# Patient Record
Sex: MALE | Race: WHITE | URBAN - METROPOLITAN AREA
[De-identification: names, ages, dates, MRNs, and addresses within clinical notes are randomized per-mention and may not be internally consistent; named-entity substitution may affect disease eponyms.]

---

## 2017-01-01 ENCOUNTER — TRANSFERRED RECORDS (OUTPATIENT)
Dept: HEALTH INFORMATION MANAGEMENT | Facility: CLINIC | Age: 67
End: 2017-01-01

## 2017-04-04 ENCOUNTER — TRANSFERRED RECORDS (OUTPATIENT)
Dept: HEALTH INFORMATION MANAGEMENT | Facility: CLINIC | Age: 67
End: 2017-04-04

## 2017-04-11 ENCOUNTER — MEDICAL CORRESPONDENCE (OUTPATIENT)
Dept: HEALTH INFORMATION MANAGEMENT | Facility: CLINIC | Age: 67
End: 2017-04-11

## 2017-04-24 ENCOUNTER — TRANSFERRED RECORDS (OUTPATIENT)
Dept: HEALTH INFORMATION MANAGEMENT | Facility: CLINIC | Age: 67
End: 2017-04-24

## 2017-10-06 ENCOUNTER — TRANSFERRED RECORDS (OUTPATIENT)
Dept: HEALTH INFORMATION MANAGEMENT | Facility: CLINIC | Age: 67
End: 2017-10-06

## 2017-10-31 ENCOUNTER — TRANSFERRED RECORDS (OUTPATIENT)
Dept: HEALTH INFORMATION MANAGEMENT | Facility: CLINIC | Age: 67
End: 2017-10-31

## 2019-04-25 NOTE — TELEPHONE ENCOUNTER
ONCOLOGY INTAKE: Records Information      APPT INFORMATION:  Referring provider:  Dr. Jimbo Slater  Referring provider s clinic:  [facility]  Reason for visit/diagnosis:  Renal Cancer    RECORDS INFORMATION:  Were the records received with the referral (via [employer])? Yes    Has patient been seen for any external appt for this diagnosis? Yes    If yes, where? [facility]    Has patient had any imaging or procedures outside of [place] for this condition? Yes      If Yes, where? [facility]    ADDITIONAL INFORMATION:  Records faxed to records team. Patient is having a knee replaced on 5/21/19 and is ok with being seen on 5/31/19.

## 2019-04-30 ENCOUNTER — DOCUMENTATION ONLY (OUTPATIENT)
Dept: CARE COORDINATION | Facility: CLINIC | Age: 69
End: 2019-04-30

## 2019-05-21 ENCOUNTER — TRANSFERRED RECORDS (OUTPATIENT)
Dept: HEALTH INFORMATION MANAGEMENT | Facility: CLINIC | Age: 69
End: 2019-05-21

## 2019-05-22 ENCOUNTER — PRE VISIT (OUTPATIENT)
Dept: UROLOGY | Facility: CLINIC | Age: 69
End: 2019-05-22

## 2019-05-31 ENCOUNTER — APPOINTMENT (OUTPATIENT)
Dept: UROLOGY | Facility: CLINIC | Age: 69
End: 2019-05-31
Payer: COMMERCIAL

## 2019-05-31 ENCOUNTER — OFFICE VISIT (OUTPATIENT)
Dept: UROLOGY | Facility: CLINIC | Age: 69
End: 2019-05-31
Payer: COMMERCIAL

## 2019-05-31 ENCOUNTER — PRE VISIT (OUTPATIENT)
Dept: UROLOGY | Facility: CLINIC | Age: 69
End: 2019-05-31

## 2019-05-31 VITALS
HEART RATE: 60 BPM | BODY MASS INDEX: 30.62 KG/M2 | HEIGHT: 73 IN | WEIGHT: 231 LBS | SYSTOLIC BLOOD PRESSURE: 102 MMHG | DIASTOLIC BLOOD PRESSURE: 69 MMHG

## 2019-05-31 DIAGNOSIS — C64.1 MALIGNANT NEOPLASM OF KIDNEY EXCLUDING RENAL PELVIS, RIGHT (H): Primary | ICD-10-CM

## 2019-05-31 RX ORDER — CEFAZOLIN SODIUM 2 G/50ML
2 SOLUTION INTRAVENOUS
Status: CANCELLED | OUTPATIENT
Start: 2019-05-31

## 2019-05-31 RX ORDER — OXYCODONE AND ACETAMINOPHEN 5; 325 MG/1; MG/1
1 TABLET ORAL EVERY 6 HOURS PRN
COMMUNITY
End: 2019-08-13 | Stop reason: ALTCHOICE

## 2019-05-31 RX ORDER — ALLOPURINOL 100 MG/1
600 TABLET ORAL EVERY EVENING
COMMUNITY

## 2019-05-31 RX ORDER — HYDROCODONE BITARTRATE AND ACETAMINOPHEN 5; 325 MG/1; MG/1
1 TABLET ORAL EVERY 6 HOURS PRN
COMMUNITY
End: 2019-08-13 | Stop reason: ALTCHOICE

## 2019-05-31 RX ORDER — CEFAZOLIN SODIUM 1 G/50ML
1 INJECTION, SOLUTION INTRAVENOUS SEE ADMIN INSTRUCTIONS
Status: CANCELLED | OUTPATIENT
Start: 2019-05-31

## 2019-05-31 RX ORDER — METOLAZONE 2.5 MG/1
2.5 TABLET ORAL DAILY PRN
Status: ON HOLD | COMMUNITY
End: 2019-08-28

## 2019-05-31 RX ORDER — COLCHICINE 0.6 MG/1
0.6 CAPSULE ORAL DAILY
COMMUNITY
End: 2019-08-13 | Stop reason: ALTCHOICE

## 2019-05-31 RX ORDER — MAGNESIUM OXIDE 420 MG/1
840 TABLET ORAL 2 TIMES DAILY
COMMUNITY

## 2019-05-31 RX ORDER — MUPIROCIN 20 MG/G
OINTMENT TOPICAL 3 TIMES DAILY
COMMUNITY
End: 2019-08-13 | Stop reason: ALTCHOICE

## 2019-05-31 RX ORDER — POTASSIUM CHLORIDE 750 MG/1
20 CAPSULE, EXTENDED RELEASE ORAL 2 TIMES DAILY
COMMUNITY

## 2019-05-31 RX ORDER — FAMOTIDINE 20 MG
TABLET ORAL
COMMUNITY
End: 2019-08-13 | Stop reason: ALTCHOICE

## 2019-05-31 ASSESSMENT — ENCOUNTER SYMPTOMS
HOARSE VOICE: 0
HEMATURIA: 0
COUGH DISTURBING SLEEP: 0
JOINT SWELLING: 1
FLANK PAIN: 0
WHEEZING: 0
SNORES LOUDLY: 0
DIARRHEA: 1
TASTE DISTURBANCE: 0
NECK PAIN: 0
JAUNDICE: 0
BLOOD IN STOOL: 0
STIFFNESS: 1
CONSTIPATION: 0
SORE THROAT: 0
COUGH: 1
VOMITING: 0
ABDOMINAL PAIN: 0
DIFFICULTY URINATING: 0
ORTHOPNEA: 0
DYSURIA: 0
SPUTUM PRODUCTION: 1
SMELL DISTURBANCE: 0
DYSPNEA ON EXERTION: 0
MYALGIAS: 0
HYPOTENSION: 0
ARTHRALGIAS: 1
PALPITATIONS: 1
SINUS PAIN: 0
TROUBLE SWALLOWING: 0
HYPERTENSION: 0
HEARTBURN: 0
SLEEP DISTURBANCES DUE TO BREATHING: 1
SYNCOPE: 0
BOWEL INCONTINENCE: 0
NAUSEA: 0
MUSCLE CRAMPS: 1
NECK MASS: 0
LIGHT-HEADEDNESS: 0
RECTAL PAIN: 0
SINUS CONGESTION: 0
MUSCLE WEAKNESS: 0
BLOATING: 0
POSTURAL DYSPNEA: 1
SHORTNESS OF BREATH: 0
LEG PAIN: 1
BACK PAIN: 0
HEMOPTYSIS: 0

## 2019-05-31 ASSESSMENT — MIFFLIN-ST. JEOR: SCORE: 1861.69

## 2019-05-31 ASSESSMENT — PAIN SCALES - GENERAL: PAINLEVEL: SEVERE PAIN (7)

## 2019-05-31 NOTE — LETTER
2019       RE: Samir Orlando  [address] [address]     Dear Colleague,    Thank you for referring your patient, Samir Orlando, to the [facility] UROLOGY AND INST FOR PROSTATE AND UROLOGIC CANCERS at [facility]. Please see a copy of my visit note below.      Urology Clinic    Tony Garcia MD  Date of Service: 2019     Name: Samir Orlando  MRN: [id number]  Age: 68 year old  : 1950  Referring provider: Provider Not In System     Assessment and Plan:  1. Papillary renal cell carcinoma, type 2 (2.7x3.3x2.9cm) (progressively increased in size since , noted to be 1.9cm in ), located in the interpolar region of the patient's solitary right kidney.  This is a very complicated situation given his multiple active medical problems, kidney cancer, and need for blood thinners.    The patient cannot undergo MRI due to pacemaker.     The patient had a renal biopsy on 2019 showing: papillary renal cell carcinoma. CT was also notable for cholelithiasis, and colonic diverticulosis without acute diverticulitis.     We had a discussion about papillary renal cell carcinoma and how I would recommend intervention. The patient's wife tells me that the patient cannot have a partial or radical nephrectomy as he has a mechanical valve and is on Coumadin. We talked about the real chance of bleeding post-operatively.    # If the patient is able to be off the Coumadin for a period of time we could consider partial nephrectomy.    # If the patient is not able to be without Coumadin the only procedure in his case is cryotherapy.     We covered surgical risks which include but are not limited to heart attack, stroke, blood clot in the legs or lungs, death, injury to surrounding organs (intestine, liver, spleen, pancreas, lung, muscles, nerves), hernias, loss of sensation around incisions, decreased renal function, and infection.      --Tentatively  "schedule cryotherapy in the next 2 months    --The patient will see the anesthesia team  ---------------------------------------------------------------------------------------------------------------------    Chief Complaint:   New patient consultation for RCC. [facility]    HPI:   Samir Orlando is a 68 year old male with a PMH of CAD s/p CABG X1, stent x3, and mitral valve repair at [place], SSS s/p PPM. Atrial fibrillation/atrial flutter s/p failed cardioversions adn ablations, Currently on dofetilide. EDGARDO, RCC s/p nephrectomy, subdural hematoma s/p craniotomy.     In terms of Urologic history, the patient had a right renal mass found on ultrasound preformed for workup prior to knee surgery. He has a solitary right kidney after left nephrectomy done at [place] in 2000. They patient and his wife note that the tumor was cancerous, but we do not have these records. He states that he was told at this time that it was unusual because the mass was \"inside the kidney\" rather than on the external surface.     [facility]) measures 3.3 cm and is located in the interpolar region. Per the records[facility] that the mass was 1.9 cm in 2015. The patient underwent renal biopsy on 03/26/2019 showing: Papillary renal cell carcinoma, type 2.    [facility]  Dr. Jeremy Georges  Solitary right kidney with mass  Right kidney US guided core biospy  DIAGNOSIS:   -Papillary renal cell carcinoma, type 2  -histologic grade G2    The patient has a history of a TURP Jan 2015.    [facility] who noted that he would be acceptable for surgical intervention.     Review of Systems:   Pertinent items are noted in HPI or as below, remainder of complete ROS is negative.      Active Medications:      allopurinol (ZYLOPRIM) 100 MG tablet, Take 100 mg by mouth daily, Disp: , Rfl:      ATORVASTATIN CALCIUM PO, Take 80 mg by mouth " "daily , Disp: , Rfl:      colchicine (MITIGARE) 0.6 MG capsule, Take 0.6 mg by mouth daily, Disp: , Rfl:      Furosemide (LASIX PO), Take 40 mg by mouth 2 times daily, Disp: , Rfl:      HYDROcodone-acetaminophen (NORCO) 5-325 MG tablet, Take 1 tablet by mouth every 6 hours as needed for severe pain, Disp: , Rfl:      Vitamin D, Cholecalciferol, 1000 units CAPS, , Disp: , Rfl:      albuterol (PROAIR HFA, PROVENTIL HFA, VENTOLIN HFA) 108 (90 BASE) MCG/ACT inhaler, Inhale 2 puffs into the lungs every 6 hours as needed for shortness of breath / dyspnea or wheezing, Disp: , Rfl:      aspirin 81 MG chewable tablet, Take 1 tablet (81 mg) by mouth daily .  Please re-evaluate wether to resume Aspirin at f/u appointment., Disp: 36 tablet, Rfl: 0     DOFETILIDE PO, Take 500 mcg by mouth 2 times daily , Disp: , Rfl:      GLIPIZIDE PO, , Disp: , Rfl:      levETIRAcetam (KEPPRA) 750 MG tablet, Take 1 tablet (750 mg) by mouth 2 times daily for 14 days, Disp: 28 tablet, Rfl: 0     LISINOPRIL PO, Take 5 mg by mouth daily, Disp: , Rfl:      METFORMIN HCL PO, , Disp: , Rfl:      METOPROLOL TARTRATE PO, Take 50 mg by mouth 2 times daily , Disp: , Rfl:      OMEPRAZOLE PO, Take 20 mg by mouth At Bedtime , Disp: , Rfl:      senna-docusate (SENOKOT-S;PERICOLACE) 8.6-50 MG per tablet, Take 1 tablet by mouth 2 times daily, Disp: 30 tablet, Rfl: 0     warfarin (COUMADIN) 1 MG tablet, Take 5 tablets (5 mg) by mouth daily .  Do not resume medication until cleared by Dr. Chapman.  Will re-evaluate at f/u appointment., Disp: 30 tablet, Rfl: 0      Allergies:   The patient reports no known allergies.     Past Medical History:  Subdural hematoma  Pacemaker     Past Surgical History:  [place] holes evacuate subdural hematoma    Family History:   No past pertinent family history.     Social History:   The patient denies drugs use    Physical Exam:   PHYSICAL EXAM  /69   Pulse 60   Ht 1.854 m (6' 1\")   Wt 104.8 kg (231 lb)   BMI 30.48 kg/m   "   Constitutional: Alert, no acute distress  Psychiatric: Normal mood and affect  Head: Normocephalic. No masses, lesions, tenderness or abnormalities  Neck: Neck supple. No adenopathy. Thyroid symmetric, normal size  Back: No spinal tenderness.  No costovertebral angle   Gastrointestinal: Abdomen soft, non-tender. No masses, organomegaly. No hernia.   Skin: no suspicious lesions or rashes on abdomen  Extremities: no lower extremity edema.   Neurologic: Cranial nerves grossly intact.  Equal strength and sensation on bilateral extremities.   : Deferred    Imaging and outside records:   -I reviewed the radiology reports and pathology reports [facility].    Laboratory:   I reviewed all applicable laboratory and pathology data and went over findings with patient  Significant for     04/03/2019: creatinine 1.2  03/25/2019: creatinine 1.3    Lab Results   Component Value Date    CR 1.11 08/13/2015    CR 1.08 08/12/2015    CR 1.34 08/11/2015    CR 1.33 08/10/2015     CBC RESULTS:  Recent Labs   Lab Test 08/10/15  2222   WBC 11.0   HGB 12.2*        BMP RESULTS:  Recent Labs   Lab Test 05/12/17  0938 08/13/15  0629 08/12/15  0718 08/11/15  0726 08/10/15  2222   NA  --   --   --   --  140   POTASSIUM  --  3.8 3.9 4.0 3.6   CHLORIDE  --   --   --   --  104   CO2  --   --   --   --  24   ANIONGAP  --   --   --   --  11   *  --   --   --  205*   BUN  --   --   --   --  22   CR  --  1.11 1.08 1.34* 1.33*   GFRESTIMATED  --  66 68 53* 54*   GFRESTBLACK  --  80 83 64 65   CLARI  --   --   --   --  10.3*     Scribe Disclosure:  I, Luis A Flores, am serving as a scribe to document services personally performed by Tony Garcia MD at this visit, based upon the provider's statements to me. All documentation has been reviewed by the aforementioned provider prior to being entered into the official medical record.     Luis A Flores served as the scribe for this patient's visit  and documented my history and physical exam.  I performed the history and physical exam.  I have edited and agree with the note.  JA Garcia MD      CC  Patient Care Team:  [facility] as PCP - General  Ari, Carrol Fuentes MD as MD (Neurosurgery)  PROVIDER NOT IN SYSTEM    Copy to patient  BALBINA REYES  [address] [address]    Answers for HPI/ROS submitted by the patient on 5/31/2019   General Symptoms: No  Skin Symptoms: No  HENT Symptoms: Yes  EYE SYMPTOMS: No  HEART SYMPTOMS: Yes  LUNG SYMPTOMS: Yes  INTESTINAL SYMPTOMS: Yes  URINARY SYMPTOMS: Yes  REPRODUCTIVE SYMPTOMS: Yes  SKELETAL SYMPTOMS: Yes  BLOOD SYMPTOMS: No  NERVOUS SYSTEM SYMPTOMS: No  MENTAL HEALTH SYMPTOMS: No  Ear pain: No  Ear discharge: No  Hearing loss: Yes  Tinnitus: Yes  Nosebleeds: No  Congestion: No  Sinus pain: No  Trouble swallowing: No   Voice hoarseness: No  Mouth sores: No  Sore throat: No  Tooth pain: No  Gum tenderness: No  Bleeding gums: No  Change in taste: No  Change in sense of smell: No  Dry mouth: No  Hearing aid used: Yes  Neck lump: No  Cough: Yes  Sputum or phlegm: Yes  Coughing up blood: No  Difficulty breating or shortness of breath: No  Snoring: No  Wheezing: No  Difficulty breathing on exertion: No  Nighttime Cough: No  Difficulty breathing when lying flat: Yes  Chest pain or pressure: No  Fast or irregular heartbeat: Yes  Pain in legs with walking: Yes  Trouble breathing while lying down: No  Fingers or toes appear blue: No  High blood pressure: No  Low blood pressure: No  Fainting: No  Murmurs: No  Pacemaker: Yes  Varicose veins: No  Edema or swelling: No  Wake up at night with shortness of breath: Yes  Light-headedness: No  Heart burn or indigestion: No  Nausea: No  Vomiting: No  Abdominal pain: No  Bloating: No  Constipation: No  Diarrhea: Yes  Blood in stool: No  Black stools: No  Rectal or Anal pain: No  Fecal incontinence: No  Yellowing of skin or eyes: No  Vomit with  blood: No  Change in stools: No  Pain or burning: No  Trouble starting or stopping: No  Increased frequency of urination: No  Blood in urine: No  Decreased frequency of urination: No  Frequent nighttime urination: No  Flank pain: No  Difficulty emptying bladder: No  Back pain: No  Muscle aches: No  Neck pain: No  Swollen joints: Yes  Joint pain: Yes  Bone pain: No  Muscle cramps: Yes  Muscle weakness: No  Joint stiffness: Yes  Bone fracture: No  Scrotal pain or swelling: No  Erectile dysfunction: Yes  Penile discharge: No  Genital ulcers: No  Reduced libido: No      Again, thank you for allowing me to participate in the care of your patient.      Sincerely,    Tony Garcia MD

## 2019-05-31 NOTE — PROGRESS NOTES
Urology Clinic    Tony Garcia MD  Date of Service: 2019     Name: Samir Orlando  MRN: [id number]  Age: 68 year old  : 1950  Referring provider: Provider Not In System     Assessment and Plan:  1. Papillary renal cell carcinoma, type 2 (2.7x3.3x2.9cm) (progressively increased in size since , noted to be 1.9cm in ), located in the interpolar region of the patient's solitary right kidney.  This is a very complicated situation given his multiple active medical problems, kidney cancer, and need for blood thinners.    The patient cannot undergo MRI due to pacemaker.     The patient had a renal biopsy on 2019 showing: papillary renal cell carcinoma. CT was also notable for cholelithiasis, and colonic diverticulosis without acute diverticulitis.     We had a discussion about papillary renal cell carcinoma and how I would recommend intervention. The patient's wife tells me that the patient cannot have a partial or radical nephrectomy as he has a mechanical valve and is on Coumadin. We talked about the real chance of bleeding post-operatively.    # If the patient is able to be off the Coumadin for a period of time we could consider partial nephrectomy.    # If the patient is not able to be without Coumadin the only procedure in his case is cryotherapy.     We covered surgical risks which include but are not limited to heart attack, stroke, blood clot in the legs or lungs, death, injury to surrounding organs (intestine, liver, spleen, pancreas, lung, muscles, nerves), hernias, loss of sensation around incisions, decreased renal function, and infection.      --Tentatively schedule cryotherapy in the next 2 months    --The patient will see the anesthesia team  ---------------------------------------------------------------------------------------------------------------------    Chief Complaint:   New patient consultation for RCC. [facility] Jimbo Marks  "[place]    HPI:   Samir Orlando is a 68 year old male with a PMH of CAD s/p CABG X1, stent x3, and mitral valve repair at [place], SSS s/p PPM. Atrial fibrillation/atrial flutter s/p failed cardioversions adn ablations, Currently on dofetilide. EDGARDO, RCC s/p nephrectomy, subdural hematoma s/p craniotomy.     In terms of Urologic history, the patient had a right renal mass found on ultrasound preformed for workup prior to knee surgery. He has a solitary right kidney after left nephrectomy done at [place] in 2000. They patient and his wife note that the tumor was cancerous, but we do not have these records. He states that he was told at this time that it was unusual because the mass was \"inside the kidney\" rather than on the external surface.     His right renal mass [facility]) measures 3.3 cm and is located in the interpolar region. Per the records, [facility] radi[facility] noted that the mass was 1.9 cm in 2015. The patient underwent renal biopsy on 03/26/2019 showing: Papillary renal cell carcinoma, type 2.    [facility]:  Dr. Jeremy Georges  Solitary right kidney with mass  Right kidney US guided core biospy  DIAGNOSIS:   -Papillary renal cell carcinoma, type 2  -histologic grade G2    The patient has a history of a TURP Jan 2015.    [facility] who noted that he would be acceptable for surgical intervention.     Review of Systems:   Pertinent items are noted in HPI or as below, remainder of complete ROS is negative.      Active Medications:      allopurinol (ZYLOPRIM) 100 MG tablet, Take 100 mg by mouth daily, Disp: , Rfl:      ATORVASTATIN CALCIUM PO, Take 80 mg by mouth daily , Disp: , Rfl:      colchicine (MITIGARE) 0.6 MG capsule, Take 0.6 mg by mouth daily, Disp: , Rfl:      Furosemide (LASIX PO), Take 40 mg by mouth 2 times daily, Disp: , Rfl:      HYDROcodone-acetaminophen (NORCO) 5-325 MG tablet, Take 1 tablet by mouth every 6 hours as needed for severe pain, Disp: , " "Rfl:      Vitamin D, Cholecalciferol, 1000 units CAPS, , Disp: , Rfl:      albuterol (PROAIR HFA, PROVENTIL HFA, VENTOLIN HFA) 108 (90 BASE) MCG/ACT inhaler, Inhale 2 puffs into the lungs every 6 hours as needed for shortness of breath / dyspnea or wheezing, Disp: , Rfl:      aspirin 81 MG chewable tablet, Take 1 tablet (81 mg) by mouth daily .  Please re-evaluate wether to resume Aspirin at f/u appointment., Disp: 36 tablet, Rfl: 0     DOFETILIDE PO, Take 500 mcg by mouth 2 times daily , Disp: , Rfl:      GLIPIZIDE PO, , Disp: , Rfl:      levETIRAcetam (KEPPRA) 750 MG tablet, Take 1 tablet (750 mg) by mouth 2 times daily for 14 days, Disp: 28 tablet, Rfl: 0     LISINOPRIL PO, Take 5 mg by mouth daily, Disp: , Rfl:      METFORMIN HCL PO, , Disp: , Rfl:      METOPROLOL TARTRATE PO, Take 50 mg by mouth 2 times daily , Disp: , Rfl:      OMEPRAZOLE PO, Take 20 mg by mouth At Bedtime , Disp: , Rfl:      senna-docusate (SENOKOT-S;PERICOLACE) 8.6-50 MG per tablet, Take 1 tablet by mouth 2 times daily, Disp: 30 tablet, Rfl: 0     warfarin (COUMADIN) 1 MG tablet, Take 5 tablets (5 mg) by mouth daily .  Do not resume medication until cleared by Dr. Chapman.  Will re-evaluate at f/u appointment., Disp: 30 tablet, Rfl: 0      Allergies:   The patient reports no known allergies.     Past Medical History:  Subdural hematoma  Pacemaker     Past Surgical History:  [place] holes evacuate subdural hematoma    Family History:   No past pertinent family history.     Social History:   The patient denies drugs use    Physical Exam:   PHYSICAL EXAM  /69   Pulse 60   Ht 1.854 m (6' 1\")   Wt 104.8 kg (231 lb)   BMI 30.48 kg/m    Constitutional: Alert, no acute distress  Psychiatric: Normal mood and affect  Head: Normocephalic. No masses, lesions, tenderness or abnormalities  Neck: Neck supple. No adenopathy. Thyroid symmetric, normal size  Back: No spinal tenderness.  No costovertebral angle   Gastrointestinal: Abdomen soft, non-tender. " No masses, organomegaly. No hernia.   Skin: no suspicious lesions or rashes on abdomen  Extremities: no lower extremity edema.   Neurologic: Cranial nerves grossly intact.  Equal strength and sensation on bilateral extremities.   : Deferred    Imaging and outside records:   -I reviewed the radiology reports and pathology reports [facility]    Laboratory:   I reviewed all applicable laboratory and pathology data and went over findings with patient  Significant for     04/03/2019: creatinine 1.2  03/25/2019: creatinine 1.3    Lab Results   Component Value Date    CR 1.11 08/13/2015    CR 1.08 08/12/2015    CR 1.34 08/11/2015    CR 1.33 08/10/2015     CBC RESULTS:  Recent Labs   Lab Test 08/10/15  2222   WBC 11.0   HGB 12.2*        BMP RESULTS:  Recent Labs   Lab Test 05/12/17  0938 08/13/15  0629 08/12/15  0718 08/11/15  0726 08/10/15  2222   NA  --   --   --   --  140   POTASSIUM  --  3.8 3.9 4.0 3.6   CHLORIDE  --   --   --   --  104   CO2  --   --   --   --  24   ANIONGAP  --   --   --   --  11   *  --   --   --  205*   BUN  --   --   --   --  22   CR  --  1.11 1.08 1.34* 1.33*   GFRESTIMATED  --  66 68 53* 54*   GFRESTBLACK  --  80 83 64 65   CLARI  --   --   --   --  10.3*     Scribe Disclosure:  I, Luis A Flores, am serving as a scribe to document services personally performed by Tony Garcia MD at this visit, based upon the provider's statements to me. All documentation has been reviewed by the aforementioned provider prior to being entered into the official medical record.     Luis A Flores served as the scribe for this patient's visit and documented my history and physical exam.  I performed the history and physical exam.  I have edited and agree with the note.  JA Garcia MD        Patient Care Team:  [facility] as PCP - General  Ari, Carrol Fuentes MD as MD (Neurosurgery)  PROVIDER NOT IN SYSTEM    Copy to  patient  BALBINA REYES  [address] [facility] [address]    Answers for HPI/ROS submitted by the patient on 5/31/2019   General Symptoms: No  Skin Symptoms: No  HENT Symptoms: Yes  EYE SYMPTOMS: No  HEART SYMPTOMS: Yes  LUNG SYMPTOMS: Yes  INTESTINAL SYMPTOMS: Yes  URINARY SYMPTOMS: Yes  REPRODUCTIVE SYMPTOMS: Yes  SKELETAL SYMPTOMS: Yes  BLOOD SYMPTOMS: No  NERVOUS SYSTEM SYMPTOMS: No  MENTAL HEALTH SYMPTOMS: No  Ear pain: No  Ear discharge: No  Hearing loss: Yes  Tinnitus: Yes  Nosebleeds: No  Congestion: No  Sinus pain: No  Trouble swallowing: No   Voice hoarseness: No  Mouth sores: No  Sore throat: No  Tooth pain: No  Gum tenderness: No  Bleeding gums: No  Change in taste: No  Change in sense of smell: No  Dry mouth: No  Hearing aid used: Yes  Neck lump: No  Cough: Yes  Sputum or phlegm: Yes  Coughing up blood: No  Difficulty breating or shortness of breath: No  Snoring: No  Wheezing: No  Difficulty breathing on exertion: No  Nighttime Cough: No  Difficulty breathing when lying flat: Yes  Chest pain or pressure: No  Fast or irregular heartbeat: Yes  Pain in legs with walking: Yes  Trouble breathing while lying down: No  Fingers or toes appear blue: No  High blood pressure: No  Low blood pressure: No  Fainting: No  Murmurs: No  Pacemaker: Yes  Varicose veins: No  Edema or swelling: No  Wake up at night with shortness of breath: Yes  Light-headedness: No  Heart burn or indigestion: No  Nausea: No  Vomiting: No  Abdominal pain: No  Bloating: No  Constipation: No  Diarrhea: Yes  Blood in stool: No  Black stools: No  Rectal or Anal pain: No  Fecal incontinence: No  Yellowing of skin or eyes: No  Vomit with blood: No  Change in stools: No  Pain or burning: No  Trouble starting or stopping: No  Increased frequency of urination: No  Blood in urine: No  Decreased frequency of urination: No  Frequent nighttime urination: No  Flank pain: No  Difficulty emptying bladder: No  Back pain: No  Muscle aches: No  Neck pain:  No  Swollen joints: Yes  Joint pain: Yes  Bone pain: No  Muscle cramps: Yes  Muscle weakness: No  Joint stiffness: Yes  Bone fracture: No  Scrotal pain or swelling: No  Erectile dysfunction: Yes  Penile discharge: No  Genital ulcers: No  Reduced libido: No

## 2019-05-31 NOTE — PATIENT INSTRUCTIONS
Schedule cryotherapy.  Pre-op appointment with our [facility] prior.    It was a pleasure meeting with you today.  Thank you for allowing me and my team the privilege of caring for you today.  YOU are the reason we are here, and I truly hope we provided you with the excellent service you deserve.  Please let us know if there is anything else we can do for you so that we can be sure you are leaving completely satisfied with your care experience.        JORGE Guajardo

## 2019-05-31 NOTE — NURSING NOTE
Chief Complaint   Patient presents with     Consult     New patient consult for RCC     Blank Gould, A

## 2019-06-04 DIAGNOSIS — C64.1 MALIGNANT NEOPLASM OF KIDNEY EXCLUDING RENAL PELVIS, RIGHT (H): Primary | ICD-10-CM

## 2019-06-30 PROBLEM — C64.1 MALIGNANT NEOPLASM OF KIDNEY EXCLUDING RENAL PELVIS, RIGHT (H): Status: ACTIVE | Noted: 2019-06-30

## 2019-07-23 ENCOUNTER — PRE VISIT (OUTPATIENT)
Dept: SURGERY | Facility: CLINIC | Age: 69
End: 2019-07-23

## 2019-07-23 NOTE — TELEPHONE ENCOUNTER
FUTURE VISIT INFORMATION      SURGERY INFORMATION:    Date: 19    Location: [facility] OR    Surgeon:  Tony Iyer    Anesthesia Type:  General    RECORDS REQUESTED FROM:       Primary Care Provider: Clif DAVIES- requested recs/testing    Pertinent Medical History: Pacemaker    Most recent EKG+ Tracin17    Most recent ECHO: 8/10/15    Most recent PFT's: 4/23/15

## 2019-08-12 ENCOUNTER — TRANSFERRED RECORDS (OUTPATIENT)
Dept: HEALTH INFORMATION MANAGEMENT | Facility: CLINIC | Age: 69
End: 2019-08-12

## 2019-08-13 ENCOUNTER — ANCILLARY PROCEDURE (OUTPATIENT)
Dept: CARDIOLOGY | Facility: CLINIC | Age: 69
End: 2019-08-13
Attending: INTERNAL MEDICINE
Payer: COMMERCIAL

## 2019-08-13 ENCOUNTER — OFFICE VISIT (OUTPATIENT)
Dept: SURGERY | Facility: CLINIC | Age: 69
End: 2019-08-13
Payer: COMMERCIAL

## 2019-08-13 ENCOUNTER — ANESTHESIA EVENT (OUTPATIENT)
Dept: SURGERY | Facility: CLINIC | Age: 69
End: 2019-08-13
Payer: COMMERCIAL

## 2019-08-13 VITALS
TEMPERATURE: 97.9 F | OXYGEN SATURATION: 98 % | RESPIRATION RATE: 19 BRPM | HEIGHT: 73 IN | BODY MASS INDEX: 30.28 KG/M2 | SYSTOLIC BLOOD PRESSURE: 122 MMHG | HEART RATE: 77 BPM | WEIGHT: 228.5 LBS | DIASTOLIC BLOOD PRESSURE: 77 MMHG

## 2019-08-13 DIAGNOSIS — Z01.818 PREOP EXAMINATION: Primary | ICD-10-CM

## 2019-08-13 DIAGNOSIS — N28.89 RENAL MASS: ICD-10-CM

## 2019-08-13 DIAGNOSIS — I45.9 HEART BLOCK: ICD-10-CM

## 2019-08-13 DIAGNOSIS — Z01.818 PREOP EXAMINATION: ICD-10-CM

## 2019-08-13 LAB
ANION GAP SERPL CALCULATED.3IONS-SCNC: 5 MMOL/L (ref 3–14)
BUN SERPL-MCNC: 30 MG/DL (ref 7–30)
CALCIUM SERPL-MCNC: 10.8 MG/DL (ref 8.5–10.1)
CHLORIDE SERPL-SCNC: 98 MMOL/L (ref 94–109)
CO2 SERPL-SCNC: 32 MMOL/L (ref 20–32)
CREAT SERPL-MCNC: 1.33 MG/DL (ref 0.66–1.25)
ERYTHROCYTE [DISTWIDTH] IN BLOOD BY AUTOMATED COUNT: 15.6 % (ref 10–15)
GFR SERPL CREATININE-BSD FRML MDRD: 54 ML/MIN/{1.73_M2}
GLUCOSE SERPL-MCNC: 122 MG/DL (ref 70–99)
HBA1C MFR BLD: 6.4 % (ref 0–5.6)
HCT VFR BLD AUTO: 44.4 % (ref 40–53)
HGB BLD-MCNC: 14.7 G/DL (ref 13.3–17.7)
INR PPP: 2.37 (ref 0.86–1.14)
MCH RBC QN AUTO: 30.8 PG (ref 26.5–33)
MCHC RBC AUTO-ENTMCNC: 33.1 G/DL (ref 31.5–36.5)
MCV RBC AUTO: 93 FL (ref 78–100)
PLATELET # BLD AUTO: 139 10E9/L (ref 150–450)
POTASSIUM SERPL-SCNC: 3.7 MMOL/L (ref 3.4–5.3)
RBC # BLD AUTO: 4.78 10E12/L (ref 4.4–5.9)
SODIUM SERPL-SCNC: 136 MMOL/L (ref 133–144)
WBC # BLD AUTO: 8.7 10E9/L (ref 4–11)

## 2019-08-13 RX ORDER — METFORMIN HCL 500 MG
1000 TABLET, EXTENDED RELEASE 24 HR ORAL 2 TIMES DAILY WITH MEALS
COMMUNITY

## 2019-08-13 SDOH — HEALTH STABILITY: MENTAL HEALTH: HOW OFTEN DO YOU HAVE A DRINK CONTAINING ALCOHOL?: MONTHLY OR LESS

## 2019-08-13 ASSESSMENT — PATIENT HEALTH QUESTIONNAIRE - PHQ9
SUM OF ALL RESPONSES TO PHQ QUESTIONS 1-9: 15
SUM OF ALL RESPONSES TO PHQ QUESTIONS 1-9: 15
10. IF YOU CHECKED OFF ANY PROBLEMS, HOW DIFFICULT HAVE THESE PROBLEMS MADE IT FOR YOU TO DO YOUR WORK, TAKE CARE OF THINGS AT HOME, OR GET ALONG WITH OTHER PEOPLE: SOMEWHAT DIFFICULT

## 2019-08-13 ASSESSMENT — PAIN SCALES - GENERAL: PAINLEVEL: SEVERE PAIN (7)

## 2019-08-13 ASSESSMENT — ENCOUNTER SYMPTOMS: DYSRHYTHMIAS: 1

## 2019-08-13 ASSESSMENT — MIFFLIN-ST. JEOR: SCORE: 1850.35

## 2019-08-13 ASSESSMENT — LIFESTYLE VARIABLES: TOBACCO_USE: 1

## 2019-08-13 NOTE — PATIENT INSTRUCTIONS
Preparing for Your Surgery      Name:  Samir Orlando   MRN:  [id number]   :  1950   Today's Date:  2019     Arriving for surgery:  Surgery date:  19  Arrival time:  06:00 am  Please come to:       [facility] [address]  [address] [place]  [address]    - ? parking is available in front of the hospital      -    Please proceed to [address] on the 3rd floor. [phone]?     - ?If you are in need of directions, wheelchair or escort please stop at the Information Desk in the lobby.  Inform the information person that you are here for surgery; a wheelchair and escort to [address] will be provided.?     What can I eat or drink?  -  You may have solid food or milk products until 8 hours prior to your surgery.  -  You may have water, apple juice or 7up/Sprite until 2 hours prior to your surgery.    Which medicines can I take?   FOLLOW INSTRUCTIONS FOR HOLDING COUMADIN AND BRIDGING BEFORE SURGERY.  Stop Aspirin, vitamins and supplements one week prior to surgery.  Hold Ibuprofen for 24 hours and/or Naproxen for 48 hours prior to surgery.   -  Do NOT take these medications in the morning, the day of surgery:  Lisinopril + potassium + metformin  if normally taken in the morning.  -  Please take these medications the day of surgery:  Tylenol if needed; take all other scheduled medications normally taken in the morning.     How do I prepare myself?  -  Take two showers: one the night before surgery; and one the morning of surgery.         Use Scrubcare or Hibiclens to wash from neck down.  You may use your own shampoo and conditioner. No other hair products.   -  Do NOT use lotion, powder, deodorant, or antiperspirant the day of your surgery.  -  Do NOT wear any jewelry.  - Do not bring your own medications to the hospital, except for inhalers and eye  drops.  -  Bring your ID and insurance card.    -If you are scheduled to go home the Same Day as surgery you  must have a responsible adult as a  and to stay with you overnight the first 24 hours after surgery.     Questions or Concerns:  -If you have questions or concerns regarding the day of surgery, please call [phone].     -For questions after surgery please call your surgeons office.

## 2019-08-13 NOTE — PATIENT INSTRUCTIONS
It was a pleasure to see you in clinic today. Please do not hesitate to call with any questions or concerns.     Quin Hernandze, RN  Electrophysiology Nurse Clinician  [facility] [place] [facility]  During business hours call:  [phone]  After business hours please call: [phone]- select option #4 and ask for job code [phone].

## 2019-08-13 NOTE — H&P
Pre-Operative H & P     CC:  Preoperative exam to assess for increased cardiopulmonary risk while undergoing surgery and anesthesia.    Date of Encounter: 8/13/2019  Primary Care Physician:  [place], [facility]  associated diagnosis: R renal mass    HPI  Samri Orlando is a 68 year old male who presents for pre-operative H & P in preparation for R percutaneous cryoablation; possible biopsy of renal mass with Dr. Garcia on 8/27/19 at [facility]. Patient is being evaluated for comorbid conditions of CAD s/p CABGx1 (2010) and stents x3; mitral valve disease s/p mechanical MVR (approx.. 10/2017) on Coumadin; sick sinus syndrome  with pacemaker; moderate pulmonary hypertension, h/o subdural hematoma s/p crani; h/o L RCC s/p L nephrectomy (2000); cholelithasis; diverticulosis, depression and anxiety.    Patient has a history of L renal mass s/p nephrectomy 2000 at [place]. Patient has a right renal mass since at least 2015 [facility]. 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 Patient met with urology to discuss interventions.   Of note, he was seen in the ED yesterday  (8/12/19) after a fall- he hit his head on some bricks. At the time, he broke his glasses and reported a headache and some stiffness and pain in his neck. Denied neurological symptoms. CT showed-  negative for acute fracture. Stability from previous studies with left frontal and parietal encephalomalacia. No other acute bleed noted per radiology.  He was told to stop warfarin until he followed up with anticoagulation clinic today (INR was 3.7). Today he reports he discussed warfarin with the clinic and they decreased his dose.  He states he has a mild headache and his shoulder and hand he fall on are still somewhat sore, otherwise no symptoms.      History is obtained from the patient and chart review.      Past Medical History  Past Medical History:   Diagnosis Date      Atrial fibrillation (H)      CAD (coronary artery disease)     s/ CABG x 1, stents x3     Cholelithiasis      Diverticulosis      DM II (diabetes mellitus, type II), controlled (H)      Mitral valve disease     s/p MVR     Renal cell carcinoma (H)      Sick sinus syndrome (H)     pacemaker     Subdural hematoma (H)     s/p craniotomy       Past Surgical History  Past Surgical History:   Procedure Laterality Date     TANA HOLE(S) EVACUATE HEMATOMA SUBDURAL Left 8/11/2015    Procedure: TANA HOLE(S) EVACUATE HEMATOMA SUBDURAL;  Surgeon: Carrol Chapman MD;  Location: [facility] OR     BYPASS GRAFT ARTERY CORONARY      x1     NEPHRECTOMY Left 2000     ORTHOPEDIC SURGERY Left 1973     REPAIR VALVE MITRAL  2017     TURP  2015       Hx of Blood transfusions/reactions: reports history of transfusion, denies reaction    Hx of abnormal bleeding or anti-platelet use: patient is on coumadin.  Bridging will be necessary. Coumadin clinic reports they will be updating patient with plan.    Personal or FH with difficulty with Anesthesia:  denies    Prior to Admission Medications  Current Outpatient Medications   Medication Sig Dispense Refill     allopurinol (ZYLOPRIM) 100 MG tablet Take 600 mg by mouth every evening        ATORVASTATIN CALCIUM PO Take 80 mg by mouth At Bedtime        cholecalciferol 1000 units TABS Take 1,000 Units by mouth daily       Furosemide (LASIX PO) Take 40 mg by mouth 2 times daily       LISINOPRIL PO Take 40 mg by mouth every morning        Magnesium Oxide 420 MG TABS Take 840 mg by mouth 2 times daily        metFORMIN (GLUCOPHAGE-XR) 500 MG 24 hr tablet Take 1,000 mg by mouth 2 times daily (with meals)       metolazone (ZAROXOLYN) 2.5 MG tablet Take 2.5 mg by mouth daily as needed        METOPROLOL TARTRATE PO Take 25 mg by mouth 2 times daily        OMEPRAZOLE PO Take 20 mg by mouth At Bedtime        potassium chloride ER (MICRO-K) 10 MEQ CR capsule Take 20 mEq by mouth 2 times daily        warfarin  (COUMADIN) 1 MG tablet Take 6mg on Monday, take 8mg the rest of the week 30 tablet 0       Allergies  Allergies   Allergen Reactions     Metformin Diarrhea     GI issues        Social History  Social History     Socioeconomic History     Marital status:      Spouse name: Not on file     Number of children: Not on file     Years of education: Not on file     Highest education level: Not on file   Occupational History     Not on file   Social Needs     Financial resource strain: Not on file     Food insecurity:     Worry: Not on file     Inability: Not on file     Transportation needs:     Medical: Not on file     Non-medical: Not on file   Tobacco Use     Smoking status: Former Smoker     Last attempt to quit:      Years since quittin.6     Smokeless tobacco: Never Used     Tobacco comment: .5 PPD, over 20 year period intermittent   Substance and Sexual Activity     Alcohol use: Yes     Frequency: Monthly or less     Comment: very rare     Drug use: Never     Sexual activity: Not on file   Lifestyle     Physical activity:     Days per week: Not on file     Minutes per session: Not on file     Stress: Not on file   Relationships     Social connections:     Talks on phone: Not on file     Gets together: Not on file     Attends L[employer]-day service: Not on file     Active member of club or organization: Not on file     Attends meetings of clubs or organizations: Not on file     Relationship status: Not on file     Intimate partner violence:     Fear of current or ex partner: Not on file     Emotionally abused: Not on file     Physically abused: Not on file     Forced sexual activity: Not on file   Other Topics Concern     Not on file   Social History Narrative     Not on file       Family History  Family History   Problem Relation Age of Onset     Anesthesia Reaction No family hx of      Deep Vein Thrombosis (DVT) No family hx of        Review of Systems  Preprocedure Note     Last edited 19 1240 by  Tash Pineda PA-C  Date of Service 19 1055  Creation Time 19 1055             Anesthesia Pre-Procedure Evaluation    Patient: Samir Orlando   MRN:     [id number] Gender:   male   Age:    68 year old :      1950        Preoperative Diagnosis: Malignant Neoplasm of Kidney Excluding Renal Pelvis, Right   Procedure(s):  Anesthesia CT Renal Tumor Cryoablation @0800     Past Medical History:   Diagnosis Date     Atrial fibrillation (H)      CAD (coronary artery disease)     s/ CABG x 1, stents x3     Cholelithiasis      Diverticulosis      DM II (diabetes mellitus, type II), controlled (H)      Mitral valve disease     s/p MVR     Renal cell carcinoma (H)      Sick sinus syndrome (H)     pacemaker     Subdural hematoma (H)     s/p craniotomy      Past Surgical History:   Procedure Laterality Date     TANA HOLE(S) EVACUATE HEMATOMA SUBDURAL Left 2015    Procedure: TANA HOLE(S) EVACUATE HEMATOMA SUBDURAL;  Surgeon: Carrol Chapman MD;  Location: [facility] OR     BYPASS GRAFT ARTERY CORONARY      x1     REPAIR VALVE MITRAL            ROS/MED HX  The complete review of systems is negative other than noted in the HPI or here.  ENT/Pulmonary:     (+)sleep apnea, tobacco use, Past use  20 year history of .5PPD; quit 1990s packs/day  uses CPAP , . .    Neurologic: Comment: H/o subdural hematoma; TBI- continuing symptoms difficulty finding words when tired and decreased hearing.       Cardiovascular:     (+) hypertension--CAD, -CABG-stent,3 . Taking blood thinners : Instructions Given to patient: pharmD is discussing plan wiht coumadin clinic today. will update with plan. Will need bridging. . . pacemaker :. dysrhythmias a-fib and Sick Sinus Syndrome, valvular problems/murmurs s/p MVR 10/2017:. Previous cardiac testing Echodate:10/31/17results:Technically difficult study. Contrast used with some improvement. Mitral valve replacement with St Efren 31 mm valve. 2D visualization is limited due to  significant shadowing. Mean inflow gradient is 5 mmHg and PHT is less than 130 msec. LV EF by visual estimation is 50-55%. Abnormal septal motion consistent with post-operative status. Early signs of increased ventricular interdependence with respiratory flattening of the IV septum. Pacer or catcher wire noted in the RA/RV. RV was not well seen, but appears enlarged in limited views. Mild aortic valve stenosis. Mean gradient is9 mmHg. Pulmonary hypertension is moderate. The estimated systolic pulmonary artery pressure is 35.8 mmHg above right atrial pressure.Stress Testdate:4/24/17 results:Impression: no ischemia demonstrates. LV EF calculated at approximately 30%, but likely affected/ inaccurate secondary to patients arrhythmia. This can be evaluated with ECHO if clinically desired. ECG reviewed date: results:Cath date: 8/22/17 results:Two vessel CAD. Patient LIMA-LAD. Moderately elevated left and right sided filling pressure. Low normal cardiac output. Moderate pulmonary hypertension (mainly secondary to increased right sided filling pressure). No evidence for constriction by simultaneous left and right ventricular pressure tracing. Large pulmonary capillary wedge V-wave tracing that may be secondary to diastolic dysfunction or mitral regurgitation or a combo of both. In summary, the patient's symptoms are likely related to a combination of diastolic dysfunction , a-fib (with loss of contribution of atrial contraction for Lv filling) and mitral regurgitation.           METS/Exercise Tolerance: Comment: Can do one flight of stairs slowly due to knee pain. Cannot walk a block because of knee pain. 1 - Eating, dressing   Hematologic:     (+) History of blood clots (patient reports large clot in left forearm in the muscle with bridging during a previous surgery) pt is anticoagulated, History of Transfusion no previous transfusion reaction -      Musculoskeletal: Comment: Multi-joint pain, gout        GI/Hepatic:  "Comment: Cholelithesis, diverticolosis.     (+) GERD Asymptomatic on medication,       Renal/Genitourinary: Comment: L sided RCC s/p L nephrectomy 2000.  Currently, R renal mass.     (+) Other Renal/ Genitourinary,       Endo:     (+) type II DM Not using insulin - not using insulin pump Obesity, .      Psychiatric:     (+) psychiatric history depression and anxiety ([facility]. )      Infectious Disease:  - neg infectious disease ROS       Malignancy:   (+) Malignancy History of Other  Other CA R RCC Remission status post Surgery         Other:            Temp: 97.9  F (36.6  C) Temp src: Oral BP: 122/77 Pulse: 77   Resp: 19 SpO2: 98 %         228 lbs 8 oz  6' 1\"   Body mass index is 30.15 kg/m .       Physical Exam  Constitutional: Awake, alert, cooperative, no apparent distress, and appears stated age. Presents with his wife.   Eyes: Pupils equal, round and reactive to light, extra ocular muscles intact, sclera clear, conjunctiva normal.  HENT: Normocephalic, oral pharynx with moist mucus membranes, good dentition. No goiter appreciated. Scratches on his head after a fall yesterday- scabbed over, no edema or drainage.  Respiratory: Clear to auscultation bilaterally, no crackles or wheezing.  Cardiovascular: irregular rate and rhythm, normal S1 and S2, and no murmur noted.  Carotids +2, no bruits. No edema. Palpable pulses to radial  And DP arteries.   GI: Normal bowel sounds, soft, non-distended, non-tender, no masses palpated, no hepatosplenomegaly.  Limited exam due to obesity.  Lymph/Hematologic: No cervical lymphadenopathy and no supraclavicular lymphadenopathy.  Genitourinary:  deferred  Skin: Warm and dry.  Patient reports No rashes at anticipated surgical site.   Musculoskeletal: Limited ROM of neck. There is no redness, warmth, or swelling of the exposed joints. Gross motor strength is normal.    Neurologic: Awake, alert, oriented to name, place and time. " Cranial nerves II-XII are grossly intact. Gait is normal.   Neuropsychiatric: Calm, cooperative. Normal affect.     Labs: (personally reviewed)  Component      Latest Ref Rng & Units 8/13/2019   Sodium      133 - 144 mmol/L 136   Potassium      3.4 - 5.3 mmol/L 3.7   Chloride      94 - 109 mmol/L 98   Carbon Dioxide      20 - 32 mmol/L 32   Anion Gap      3 - 14 mmol/L 5   Glucose      70 - 99 mg/dL 122 (H)   Urea Nitrogen      7 - 30 mg/dL 30   Creatinine      0.66 - 1.25 mg/dL 1.33 (H)   GFR Estimate      >60 mL/min/1.73:m2 54 (L)   GFR Estimate If Black      >60 mL/min/1.73:m2 62   Calcium      8.5 - 10.1 mg/dL 10.8 (H)   WBC      4.0 - 11.0 10e9/L 8.7   RBC Count      4.4 - 5.9 10e12/L 4.78   Hemoglobin      13.3 - 17.7 g/dL 14.7   Hematocrit      40.0 - 53.0 % 44.4   MCV      78 - 100 fl 93   MCH      26.5 - 33.0 pg 30.8   MCHC      31.5 - 36.5 g/dL 33.1   RDW      10.0 - 15.0 % 15.6 (H)   Platelet Count      150 - 450 10e9/L 139 (L)   ABO       O   RH(D)       Pos   Antibody Screen       Neg   Test Valid Only At       [facility]   Specimen Expires       08/16/2019   INR      0.86 - 1.14 2.37 (H)     Component      Latest Ref Rng & Units 8/13/2019   Hemoglobin A1C      0 - 5.6 % 6.4 (H)       L heart cath, coronary angiography, bylass graft angiography, R heart cath 8/22/17  Two vessel CAD. Patient LIMA-LAD. Moderately elevated left and right sided filling pressure. Low normal cardiac output. Moderate pulmonary hypertension (mainly secondary to increased right sided filling pressure). No evidence for constriction by simultaneous left and right ventricular pressure tracing. Large pulmonary capillary wedge V-wave tracing that may be secondary to diastolic dysfunction or mitral regurgitation or a combo of both. In summary, the patient s symptoms are likely related to a combination of diastolic dysfunction , a-fib (with loss of contribution of atrial contraction for Lv  filling) and mitral regurgitation.     Stress test 4/24/2017  Impression: no ischemia demonstrates. LV EF calculated at approximately 30%, but likely affected/ inaccurate secondary to patients arrhythmia. This can be evaluated with ECHO if clinically desired.     ECHO 10/31/17  Technically difficult study. Contrast used with some improvement. Mitral valve replacement with St Efren 31 mm valve. 2D visualization is limited due to significant shadowing. Mean inflow gradient is 5 mmHg and PHT is less than 130 msec. LV EF by visual estimation is 50-55%. Abnormal septal motion consistent with post-operative status. Early signs of increased ventricular interdependence with respiratory flattening of the IV septum. Pacer or catcher wire noted in the RA/RV. RV was not well seen, but appears enlarged in limited views. Mild aortic valve stenosis. Mean gradient is9 mmHg. Pulmonary hypertension is moderate. The estimated systolic pulmonary artery pressure is 35.8 mmHg above right atrial pressure.    5/2015 PET cardiac viability  Impression:  1. No changes to indicate active sarcoidosis within the chest, heart, or body. No interstitial changes within the lungs indicating underlying old sarcoidosis.  2. Significant perfusion abnormalities involving the [place] of the left ventricle with thick with involvement of the distal anterolateral wall, septum, inferolateral wall and a small amount of the lateral wall.    3. Unclear if the perfusion abnormalities of the heart are small vessel or large vessel disease. The reduced quantitative myocardial perfusion raises concern that there could be large vessel coronary  artery disease.    PFTs 4/2015  Normal spirometry. Diffusing capacity reduced. Compared to test 11/2014, improved.    Outside records reviewed from[facility], [facility]    ASSESSMENT and PLAN  Samir Orlando is a 68 year old male scheduled for R percutaneous cryoablation; possible biopsy of renal mass on 8/27/19 by   Jose in treatment of R renal mass.  PAC referral for risk assessment and optimization for anesthesia with comorbid conditions of CAD s/p CABGx1 (2010) and stents x3; mitral valve disease s/p mechanical MVR (approx.. 10/2017) on Coumadin; sick sinus syndrome  with pacemaker; moderate pulmonary hypertension, h/o subdural hematoma s/p crani; h/o L RCC s/p L nephrectomy (2000); cholelithasis; diverticulosis, depression and anxiety:    Pre-operative considerations:  1.  Cardiac:  Functional status- METS 1- due to knee pain, he states he is only able to ascend one flight of stairs at a time, and is unable to walk 1 block due to knee pain.  Denies CP, SOB.  History of CAD s/p CABGx1 (2010), stents x3- cath/ angiography 2017 showed Two vessel CAD,  Patient LIMA-LAD. H/o Moderate pulmonary hypertension. H/o mitral valve disease s/p MVR 10/2017. ECHO since that time showed EF 50-55%, mild aortic valve stenosis. Afib, sick sinus syndrome with implanted pacemaker- interrogation today showed patient is not dependent- settings VVIR . On warfarin- see heme section. intermediate risk surgery with 0.9% risk of major adverse cardiac event.   2.  Pulm:  Airway- potentially difficult. Mallampati 4, limited neck ROM due to fall yesterday.  EDGARDO- on CPAP- will bring DOS. Smoking history- quit in 1990s.   3.  GI:  Risk of PONV score = 2.  If > 2, anti-emetic intervention recommended. GERD well controlled on omeprazole- take DOS. H/o cholelithesis and diverticulosis.   4.  Heme: history of transfusion without reaction. T&S to be collected today. Patient is on coumadin. His coumadin clinic knows about upcoming surgery- bridging will be necessary. His INR has been high, so they decreased his dose starting today.Coumadin clinic reports they will be updating patient with plan. Of note, patient reports he developed a large clot in the muscle of his left forarm when bridging for a previous surgery. No history of DVT or PE. DVT risk 3%.    6. Neuro: h/o subdural hematoma after a fall s/p craniotomy. Patient reports since that time, he ha shad some difficulty finding words when he is tired and his hearing has gotten worse. Otherwise reports no persistent symptoms.  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   7. Endocrine: DMII. On metformin BID (hold DOS). A1c to be collected today.   8. Renal: h/o L RCC s/p left nephrectomy (~2000). He now has a mass on right kidney. Cr/ GFR to be collected today- 1.33. Will repeat DOS.  9. Special needs: patient has bilateral hearing aids and cannot hear without them.   10. Pain: patient reports difficulty staying on top of pain in the past after surgeries.  He reports Ativan has been used in addition to pain meds with some success in the past.     Patient is optimized and is acceptable candidate for the proposed procedure.  No further diagnostic evaluation is needed.     Patient also evaluated by Dr. Hankins.      Tash Pineda PA-C  Preoperative Assessment Center  [facility]  Clinic and Surgery Center  Phone: [phone]  Fax: [phone]
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
## 2019-08-13 NOTE — PROGRESS NOTES
Preoperative Assessment Center medication history for August 13, 2019 is complete.    See Epic admission navigator for prior to admission medications.   Operating room staff will still need to confirm medications and last dose information on day of surgery.     Medication history interview sources:   Patient, medication list    Changes made to PTA medication list (reason)  Added: none  Deleted: Albuterol, colchicine, enoxaparin, hydrocodone - acetaminophen, mupirocin, oxycodone - acetaminophen  Changed:  Metoprolol dose decreased, metolazone change to prn dosing.    Additional medication history information (including reliability of information, actions taken by pharmacist):None    -- No recent (within 30 days) course of antibiotics  -- No recent (within 30 days) course of steroids  -- No recent (within 30 days) chronic daily medications stopped   -- Patient declines being on any other prescription or over-the-counter medications    Prior to Admission medications    Medication Sig Last Dose Taking? Auth Provider   allopurinol (ZYLOPRIM) 100 MG tablet Take 600 mg by mouth every evening  Taking Yes Reported, Patient   ATORVASTATIN CALCIUM PO Take 80 mg by mouth At Bedtime  Taking Yes Reported, Patient   cholecalciferol 1000 units TABS Take 1,000 Units by mouth daily Taking Yes Unknown, Entered By History   Furosemide (LASIX PO) Take 40 mg by mouth 2 times daily Taking Yes Reported, Patient   LISINOPRIL PO Take 40 mg by mouth every morning  Taking Yes Reported, Patient   Magnesium Oxide 420 MG TABS Take 840 mg by mouth 2 times daily  Taking Yes Reported, Patient   metFORMIN (GLUCOPHAGE-XR) 500 MG 24 hr tablet Take 1,000 mg by mouth 2 times daily (with meals) Taking Yes Unknown, Entered By History   metolazone (ZAROXOLYN) 2.5 MG tablet Take 2.5 mg by mouth daily as needed  Taking Yes Reported, Patient   METOPROLOL TARTRATE PO Take 25 mg by mouth 2 times daily  Taking Yes Reported, Patient   OMEPRAZOLE PO Take 20 mg by  mouth At Bedtime  Taking Yes Reported, Patient   potassium chloride ER (MICRO-K) 10 MEQ CR capsule Take 20 mEq by mouth 2 times daily  Taking Yes Reported, Patient   warfarin (COUMADIN) 1 MG tablet Take 5 mg by mouth daily Take 6mg on Monday, take 8mg the rest of the week Taking Yes Maddy Pinedo APRN CNP         Medication history completed by: Grace Allan RPH

## 2019-08-13 NOTE — PHARMACY - PREOPERATIVE ASSESSMENT CENTER
Anticoagulation Note - Preoperative Assessment Center (PAC) Pharmacist     Patient seen and interviewed during time of PAC Clinic appointment August 13, 2019.  The purpose of this note is to document the perioperative anticoagulation plan outlined by the providers caring for Samir Orlando.     Current Regimen  Anticoagulation Regimen as of August 13, 2019:  Warfarin 6mg on Mondays, 8mg the rest of the week.  Indication: Afib, mechanical valve  Prescriber:  [facility]  Expected Duration of therapy: Indefinite  INR Goal: 2.5-3.5  Recent Change in oral intake/nutrition: No    Perioperative plan  Samir Orlando is scheduled for Anesthesia CT Renal Tumor Cryoablation on 8/27/19 with Dr. Garcia and the perioperative anticoagulation plan outlined by Lynnette Morris, [facility] anticoagulation clinic is to hold warfarin and bridge with enoxaparin.   The anticoagulation clinic will take care of patient instruction and prescribing enoxaparin.   Resumption of anticoagulation after procedure will be based on surgery team assessment of bleeding risks and complications.  This plan may require re-assessment and modification by his primary team in the perioperative setting depending on patients clinical situation.        Grace Allan, PharmD, MS  August 13, 2019  11:39 AM

## 2019-08-20 ENCOUNTER — PATIENT OUTREACH (OUTPATIENT)
Dept: UROLOGY | Facility: CLINIC | Age: 69
End: 2019-08-20

## 2019-08-20 DIAGNOSIS — C64.1 MALIGNANT NEOPLASM OF KIDNEY EXCLUDING RENAL PELVIS, RIGHT (H): Primary | ICD-10-CM

## 2019-08-20 NOTE — PROGRESS NOTES
Pre Op Teaching Flowsheet       Pre and Post op Patient Education  Relevant Diagnosis:  Right renal mass  Surgical procedure:  Right percutaneous cryoablation, possible biopsy of renal mass  Teaching Topic:  Pre and post op teaching  Person Involved in teaching: Samir Orlando    Motivation Level:  Asks Questions: Yes  Eager to Learn:  Yes  Cooperative: Yes  Receptive (willing/able to accept information):  Yes    Patient demonstrates understanding of the following:  Date of surgery:  8/27/19  Location of surgery:  [address] [place] [address]  History and Physical and any other testing necessary prior to surgery: Yes  Required time line for completion of History and Physical and any pre-op testing: Yes    Patient demonstrates understanding of the following:  Pre-op bowel prep:  None  Pre-op showering/scrub information with PCMX Soap: Yes  Blood thinner medications discussed and when to stop (if applicable):  Yes      Infection Prevention:   Patient demonstrates understanding of the following:  Surgical procedure site care taught: at time of discharge  Signs and symptoms of infection taught:  Yes      Post-op follow-up:  Discussed how to contact the hospital, nurse, and clinic scheduling staff if necessary.    Instructional materials used/given/mailed:  [facility] Booklet, post op teaching sheet, Map, Soap, and arrival/location information.    Surgical instructions packet mailed to patient.    Patient has a  and someone to stay with her for the first 24 hours.

## 2019-08-23 ENCOUNTER — HOSPITAL ENCOUNTER (OUTPATIENT)
Dept: LAB | Facility: CLINIC | Age: 69
Discharge: HOME | End: 2019-08-23
Attending: UROLOGY | Admitting: UROLOGY
Payer: COMMERCIAL

## 2019-08-23 DIAGNOSIS — C64.1 MALIGNANT NEOPLASM OF KIDNEY EXCLUDING RENAL PELVIS, RIGHT (H): ICD-10-CM

## 2019-08-23 LAB
ALBUMIN UR-MCNC: NEGATIVE MG/DL
APPEARANCE UR: CLEAR
BILIRUB UR QL STRIP: NEGATIVE
COLOR UR AUTO: ABNORMAL
GLUCOSE UR STRIP-MCNC: 150 MG/DL
HGB UR QL STRIP: NEGATIVE
KETONES UR STRIP-MCNC: NEGATIVE MG/DL
LEUKOCYTE ESTERASE UR QL STRIP: NEGATIVE
MUCOUS THREADS #/AREA URNS LPF: PRESENT /LPF
NITRATE UR QL: NEGATIVE
PH UR STRIP: 6.5 PH (ref 5–7)
RBC #/AREA URNS AUTO: <1 /HPF (ref 0–2)
SOURCE: ABNORMAL
SP GR UR STRIP: 1.01 (ref 1–1.03)
UROBILINOGEN UR STRIP-MCNC: NORMAL MG/DL (ref 0–2)
WBC #/AREA URNS AUTO: <1 /HPF (ref 0–5)

## 2019-08-23 PROCEDURE — 81001 URINALYSIS AUTO W/SCOPE: CPT | Performed by: UROLOGY

## 2019-08-27 ENCOUNTER — ANESTHESIA (OUTPATIENT)
Dept: SURGERY | Facility: CLINIC | Age: 69
End: 2019-08-27
Payer: COMMERCIAL

## 2019-08-27 ENCOUNTER — HOSPITAL ENCOUNTER (OUTPATIENT)
Facility: CLINIC | Age: 69
Setting detail: OBSERVATION
Discharge: HOME | End: 2019-08-28
Attending: UROLOGY | Admitting: UROLOGY
Payer: COMMERCIAL

## 2019-08-27 ENCOUNTER — APPOINTMENT (OUTPATIENT)
Dept: INTERVENTIONAL RADIOLOGY/VASCULAR | Facility: CLINIC | Age: 69
End: 2019-08-27
Attending: UROLOGY
Payer: COMMERCIAL

## 2019-08-27 ENCOUNTER — ANCILLARY PROCEDURE (OUTPATIENT)
Dept: CARDIOLOGY | Facility: CLINIC | Age: 69
End: 2019-08-27
Attending: INTERNAL MEDICINE
Payer: COMMERCIAL

## 2019-08-27 DIAGNOSIS — I45.9 HEART BLOCK: ICD-10-CM

## 2019-08-27 DIAGNOSIS — Z95.0 PACEMAKER: ICD-10-CM

## 2019-08-27 DIAGNOSIS — Z95.0 PACEMAKER: Primary | ICD-10-CM

## 2019-08-27 DIAGNOSIS — C64.1 MALIGNANT NEOPLASM OF KIDNEY EXCLUDING RENAL PELVIS, RIGHT (H): ICD-10-CM

## 2019-08-27 PROBLEM — N28.89 RENAL MASS: Status: ACTIVE | Noted: 2019-08-27

## 2019-08-27 LAB
ABO + RH BLD: NORMAL
ABO + RH BLD: NORMAL
ANION GAP SERPL CALCULATED.3IONS-SCNC: 11 MMOL/L (ref 3–14)
APTT PPP: 35 SEC (ref 22–37)
BLD GP AB SCN SERPL QL: NORMAL
BLOOD BANK CMNT PATIENT-IMP: NORMAL
BUN SERPL-MCNC: 29 MG/DL (ref 7–30)
CALCIUM SERPL-MCNC: 10.4 MG/DL (ref 8.5–10.1)
CHLORIDE SERPL-SCNC: 97 MMOL/L (ref 94–109)
CO2 SERPL-SCNC: 27 MMOL/L (ref 20–32)
CREAT SERPL-MCNC: 1.33 MG/DL (ref 0.66–1.25)
ERYTHROCYTE [DISTWIDTH] IN BLOOD BY AUTOMATED COUNT: 15.4 % (ref 10–15)
GFR SERPL CREATININE-BSD FRML MDRD: 53 ML/MIN/{1.73_M2}
GLUCOSE BLDC GLUCOMTR-MCNC: 135 MG/DL (ref 70–99)
GLUCOSE BLDC GLUCOMTR-MCNC: 154 MG/DL (ref 70–99)
GLUCOSE SERPL-MCNC: 128 MG/DL (ref 70–99)
HCT VFR BLD AUTO: 43.1 % (ref 40–53)
HGB BLD-MCNC: 14.1 G/DL (ref 13.3–17.7)
INR PPP: 1.2 (ref 0.86–1.14)
MCH RBC QN AUTO: 30.3 PG (ref 26.5–33)
MCHC RBC AUTO-ENTMCNC: 32.7 G/DL (ref 31.5–36.5)
MCV RBC AUTO: 93 FL (ref 78–100)
PLATELET # BLD AUTO: 142 10E9/L (ref 150–450)
POTASSIUM SERPL-SCNC: 3.3 MMOL/L (ref 3.4–5.3)
RBC # BLD AUTO: 4.66 10E12/L (ref 4.4–5.9)
SODIUM SERPL-SCNC: 135 MMOL/L (ref 133–144)
SPECIMEN EXP DATE BLD: NORMAL
WBC # BLD AUTO: 8.4 10E9/L (ref 4–11)

## 2019-08-27 PROCEDURE — 25000128 H RX IP 250 OP 636: Performed by: NURSE ANESTHETIST, CERTIFIED REGISTERED

## 2019-08-27 PROCEDURE — 25800030 ZZH RX IP 258 OP 636: Performed by: STUDENT IN AN ORGANIZED HEALTH CARE EDUCATION/TRAINING PROGRAM

## 2019-08-27 PROCEDURE — 00000146 ZZHCL STATISTIC GLUCOSE BY METER IP

## 2019-08-27 PROCEDURE — 82962 GLUCOSE BLOOD TEST: CPT

## 2019-08-27 PROCEDURE — 71000014 ZZH RECOVERY PHASE 1 LEVEL 2 FIRST HR

## 2019-08-27 PROCEDURE — 37000009 ZZH ANESTHESIA TECHNICAL FEE, EACH ADDTL 15 MIN

## 2019-08-27 PROCEDURE — 25800030 ZZH RX IP 258 OP 636: Performed by: RADIOLOGY

## 2019-08-27 PROCEDURE — 27210908 ZZH NEEDLE CR4

## 2019-08-27 PROCEDURE — G0378 HOSPITAL OBSERVATION PER HR: HCPCS

## 2019-08-27 PROCEDURE — 71000015 ZZH RECOVERY PHASE 1 LEVEL 2 EA ADDTL HR

## 2019-08-27 PROCEDURE — 40000275 ZZH STATISTIC RCP TIME EA 10 MIN

## 2019-08-27 PROCEDURE — C2618 PROBE/NEEDLE, CRYO: HCPCS

## 2019-08-27 PROCEDURE — 25000132 ZZH RX MED GY IP 250 OP 250 PS 637: Performed by: STUDENT IN AN ORGANIZED HEALTH CARE EDUCATION/TRAINING PROGRAM

## 2019-08-27 PROCEDURE — 40000170 ZZH STATISTIC PRE-PROCEDURE ASSESSMENT II

## 2019-08-27 PROCEDURE — 25000128 H RX IP 250 OP 636: Performed by: STUDENT IN AN ORGANIZED HEALTH CARE EDUCATION/TRAINING PROGRAM

## 2019-08-27 PROCEDURE — 27210732 ZZH ACCESSORY CR1

## 2019-08-27 PROCEDURE — 25000565 ZZH ISOFLURANE, EA 15 MIN

## 2019-08-27 PROCEDURE — 25000125 ZZHC RX 250: Performed by: NURSE ANESTHETIST, CERTIFIED REGISTERED

## 2019-08-27 PROCEDURE — 93280 PM DEVICE PROGR EVAL DUAL: CPT

## 2019-08-27 PROCEDURE — 25000128 H RX IP 250 OP 636: Performed by: RADIOLOGY

## 2019-08-27 PROCEDURE — 50593 PERC CRYO ABLATE RENAL TUM: CPT

## 2019-08-27 PROCEDURE — 40000014 ZZH STATISTIC ARTERIAL MONITORING DAILY

## 2019-08-27 PROCEDURE — 85730 THROMBOPLASTIN TIME PARTIAL: CPT | Performed by: RADIOLOGY

## 2019-08-27 PROCEDURE — 25800030 ZZH RX IP 258 OP 636: Performed by: NURSE ANESTHETIST, CERTIFIED REGISTERED

## 2019-08-27 PROCEDURE — 36415 COLL VENOUS BLD VENIPUNCTURE: CPT | Performed by: RADIOLOGY

## 2019-08-27 PROCEDURE — 37000008 ZZH ANESTHESIA TECHNICAL FEE, 1ST 30 MIN

## 2019-08-27 PROCEDURE — 80048 BASIC METABOLIC PNL TOTAL CA: CPT | Performed by: RADIOLOGY

## 2019-08-27 PROCEDURE — 93286 PERI-PX EVAL PM/LDLS PM IP: CPT | Mod: 26 | Performed by: INTERNAL MEDICINE

## 2019-08-27 PROCEDURE — 85027 COMPLETE CBC AUTOMATED: CPT | Performed by: RADIOLOGY

## 2019-08-27 PROCEDURE — 85610 PROTHROMBIN TIME: CPT | Performed by: RADIOLOGY

## 2019-08-27 RX ORDER — PROPOFOL 10 MG/ML
INJECTION, EMULSION INTRAVENOUS CONTINUOUS PRN
Status: DISCONTINUED | OUTPATIENT
Start: 2019-08-27 | End: 2019-08-27

## 2019-08-27 RX ORDER — ALBUTEROL SULFATE 0.83 MG/ML
2.5 SOLUTION RESPIRATORY (INHALATION) EVERY 4 HOURS PRN
Status: DISCONTINUED | OUTPATIENT
Start: 2019-08-27 | End: 2019-08-27 | Stop reason: HOSPADM

## 2019-08-27 RX ORDER — EPHEDRINE SULFATE 50 MG/ML
INJECTION, SOLUTION INTRAMUSCULAR; INTRAVENOUS; SUBCUTANEOUS PRN
Status: DISCONTINUED | OUTPATIENT
Start: 2019-08-27 | End: 2019-08-27

## 2019-08-27 RX ORDER — LISINOPRIL 40 MG/1
40 TABLET ORAL EVERY MORNING
Status: DISCONTINUED | OUTPATIENT
Start: 2019-08-27 | End: 2019-08-28 | Stop reason: HOSPADM

## 2019-08-27 RX ORDER — METOPROLOL TARTRATE 25 MG/1
25 TABLET, FILM COATED ORAL 2 TIMES DAILY
Status: DISCONTINUED | OUTPATIENT
Start: 2019-08-27 | End: 2019-08-28 | Stop reason: HOSPADM

## 2019-08-27 RX ORDER — HEPARIN SODIUM 10000 [USP'U]/100ML
0-3500 INJECTION, SOLUTION INTRAVENOUS CONTINUOUS
Status: DISCONTINUED | OUTPATIENT
Start: 2019-08-27 | End: 2019-08-27

## 2019-08-27 RX ORDER — CEFAZOLIN SODIUM 1 G/3ML
1 INJECTION, POWDER, FOR SOLUTION INTRAMUSCULAR; INTRAVENOUS SEE ADMIN INSTRUCTIONS
Status: DISCONTINUED | OUTPATIENT
Start: 2019-08-27 | End: 2019-08-27 | Stop reason: HOSPADM

## 2019-08-27 RX ORDER — ATORVASTATIN CALCIUM 80 MG/1
80 TABLET, FILM COATED ORAL AT BEDTIME
Status: DISCONTINUED | OUTPATIENT
Start: 2019-08-27 | End: 2019-08-28 | Stop reason: HOSPADM

## 2019-08-27 RX ORDER — HYDROMORPHONE HCL/0.9% NACL/PF 0.2MG/0.2
0.2 SYRINGE (ML) INTRAVENOUS
Status: DISCONTINUED | OUTPATIENT
Start: 2019-08-27 | End: 2019-08-28 | Stop reason: HOSPADM

## 2019-08-27 RX ORDER — ONDANSETRON 2 MG/ML
4 INJECTION INTRAMUSCULAR; INTRAVENOUS ONCE
Status: COMPLETED | OUTPATIENT
Start: 2019-08-27 | End: 2019-08-27

## 2019-08-27 RX ORDER — HYDROMORPHONE HYDROCHLORIDE 1 MG/ML
.3-.5 INJECTION, SOLUTION INTRAMUSCULAR; INTRAVENOUS; SUBCUTANEOUS EVERY 10 MIN PRN
Status: DISCONTINUED | OUTPATIENT
Start: 2019-08-27 | End: 2019-08-27 | Stop reason: HOSPADM

## 2019-08-27 RX ORDER — NALOXONE HYDROCHLORIDE 0.4 MG/ML
.1-.4 INJECTION, SOLUTION INTRAMUSCULAR; INTRAVENOUS; SUBCUTANEOUS
Status: DISCONTINUED | OUTPATIENT
Start: 2019-08-27 | End: 2019-08-27 | Stop reason: HOSPADM

## 2019-08-27 RX ORDER — FENTANYL CITRATE 50 UG/ML
INJECTION, SOLUTION INTRAMUSCULAR; INTRAVENOUS PRN
Status: DISCONTINUED | OUTPATIENT
Start: 2019-08-27 | End: 2019-08-27

## 2019-08-27 RX ORDER — POTASSIUM CHLORIDE 750 MG/1
20 CAPSULE, EXTENDED RELEASE ORAL 2 TIMES DAILY
Status: DISCONTINUED | OUTPATIENT
Start: 2019-08-27 | End: 2019-08-28 | Stop reason: HOSPADM

## 2019-08-27 RX ORDER — CEFAZOLIN SODIUM 1 G/50ML
2 SOLUTION INTRAVENOUS
Status: DISCONTINUED | OUTPATIENT
Start: 2019-08-27 | End: 2019-08-27 | Stop reason: HOSPADM

## 2019-08-27 RX ORDER — ONDANSETRON 2 MG/ML
4 INJECTION INTRAMUSCULAR; INTRAVENOUS EVERY 6 HOURS PRN
Status: DISCONTINUED | OUTPATIENT
Start: 2019-08-27 | End: 2019-08-28 | Stop reason: HOSPADM

## 2019-08-27 RX ORDER — SODIUM CHLORIDE 9 MG/ML
INJECTION, SOLUTION INTRAVENOUS CONTINUOUS
Status: DISCONTINUED | OUTPATIENT
Start: 2019-08-27 | End: 2019-08-28 | Stop reason: HOSPADM

## 2019-08-27 RX ORDER — MEPERIDINE HYDROCHLORIDE 25 MG/ML
12.5 INJECTION INTRAMUSCULAR; INTRAVENOUS; SUBCUTANEOUS
Status: DISCONTINUED | OUTPATIENT
Start: 2019-08-27 | End: 2019-08-27 | Stop reason: HOSPADM

## 2019-08-27 RX ORDER — LIDOCAINE 40 MG/G
CREAM TOPICAL
Status: DISCONTINUED | OUTPATIENT
Start: 2019-08-27 | End: 2019-08-28 | Stop reason: HOSPADM

## 2019-08-27 RX ORDER — KETOROLAC TROMETHAMINE 30 MG/ML
15 INJECTION, SOLUTION INTRAMUSCULAR; INTRAVENOUS ONCE
Status: DISCONTINUED | OUTPATIENT
Start: 2019-08-27 | End: 2019-08-27 | Stop reason: HOSPADM

## 2019-08-27 RX ORDER — ONDANSETRON 4 MG/1
4 TABLET, ORALLY DISINTEGRATING ORAL EVERY 6 HOURS PRN
Status: DISCONTINUED | OUTPATIENT
Start: 2019-08-27 | End: 2019-08-28 | Stop reason: HOSPADM

## 2019-08-27 RX ORDER — CEFAZOLIN SODIUM 1 G/50ML
2 SOLUTION INTRAVENOUS
Status: COMPLETED | OUTPATIENT
Start: 2019-08-27 | End: 2019-08-27

## 2019-08-27 RX ORDER — OXYCODONE HYDROCHLORIDE 5 MG/1
5-10 TABLET ORAL EVERY 4 HOURS PRN
Status: DISCONTINUED | OUTPATIENT
Start: 2019-08-27 | End: 2019-08-28 | Stop reason: HOSPADM

## 2019-08-27 RX ORDER — ONDANSETRON 4 MG/1
4 TABLET, ORALLY DISINTEGRATING ORAL EVERY 30 MIN PRN
Status: DISCONTINUED | OUTPATIENT
Start: 2019-08-27 | End: 2019-08-27 | Stop reason: HOSPADM

## 2019-08-27 RX ORDER — SODIUM CHLORIDE, SODIUM LACTATE, POTASSIUM CHLORIDE, CALCIUM CHLORIDE 600; 310; 30; 20 MG/100ML; MG/100ML; MG/100ML; MG/100ML
INJECTION, SOLUTION INTRAVENOUS CONTINUOUS
Status: DISCONTINUED | OUTPATIENT
Start: 2019-08-27 | End: 2019-08-27 | Stop reason: HOSPADM

## 2019-08-27 RX ORDER — LIDOCAINE 40 MG/G
CREAM TOPICAL
Status: DISCONTINUED | OUTPATIENT
Start: 2019-08-27 | End: 2019-08-27 | Stop reason: HOSPADM

## 2019-08-27 RX ORDER — LIDOCAINE HYDROCHLORIDE 20 MG/ML
INJECTION, SOLUTION INFILTRATION; PERINEURAL PRN
Status: DISCONTINUED | OUTPATIENT
Start: 2019-08-27 | End: 2019-08-27

## 2019-08-27 RX ORDER — DEXTROSE MONOHYDRATE 25 G/50ML
25-50 INJECTION, SOLUTION INTRAVENOUS
Status: DISCONTINUED | OUTPATIENT
Start: 2019-08-27 | End: 2019-08-27 | Stop reason: HOSPADM

## 2019-08-27 RX ORDER — NALOXONE HYDROCHLORIDE 0.4 MG/ML
.1-.4 INJECTION, SOLUTION INTRAMUSCULAR; INTRAVENOUS; SUBCUTANEOUS
Status: DISCONTINUED | OUTPATIENT
Start: 2019-08-27 | End: 2019-08-28 | Stop reason: HOSPADM

## 2019-08-27 RX ORDER — METFORMIN HCL 500 MG
1000 TABLET, EXTENDED RELEASE 24 HR ORAL 2 TIMES DAILY WITH MEALS
Status: DISCONTINUED | OUTPATIENT
Start: 2019-08-27 | End: 2019-08-28 | Stop reason: HOSPADM

## 2019-08-27 RX ORDER — FENTANYL CITRATE 50 UG/ML
25-50 INJECTION, SOLUTION INTRAMUSCULAR; INTRAVENOUS EVERY 5 MIN PRN
Status: DISCONTINUED | OUTPATIENT
Start: 2019-08-27 | End: 2019-08-27 | Stop reason: HOSPADM

## 2019-08-27 RX ORDER — NICOTINE POLACRILEX 4 MG
15-30 LOZENGE BUCCAL
Status: DISCONTINUED | OUTPATIENT
Start: 2019-08-27 | End: 2019-08-27 | Stop reason: HOSPADM

## 2019-08-27 RX ORDER — PROPOFOL 10 MG/ML
INJECTION, EMULSION INTRAVENOUS PRN
Status: DISCONTINUED | OUTPATIENT
Start: 2019-08-27 | End: 2019-08-27

## 2019-08-27 RX ORDER — ALLOPURINOL 300 MG/1
600 TABLET ORAL EVERY EVENING
Status: DISCONTINUED | OUTPATIENT
Start: 2019-08-27 | End: 2019-08-28 | Stop reason: HOSPADM

## 2019-08-27 RX ORDER — HEPARIN SODIUM 10000 [USP'U]/100ML
0-3500 INJECTION, SOLUTION INTRAVENOUS CONTINUOUS
Status: DISCONTINUED | OUTPATIENT
Start: 2019-08-27 | End: 2019-08-28 | Stop reason: ALTCHOICE

## 2019-08-27 RX ORDER — SODIUM CHLORIDE 9 MG/ML
INJECTION, SOLUTION INTRAVENOUS CONTINUOUS
Status: DISCONTINUED | OUTPATIENT
Start: 2019-08-27 | End: 2019-08-27 | Stop reason: HOSPADM

## 2019-08-27 RX ORDER — ONDANSETRON 2 MG/ML
4 INJECTION INTRAMUSCULAR; INTRAVENOUS EVERY 30 MIN PRN
Status: DISCONTINUED | OUTPATIENT
Start: 2019-08-27 | End: 2019-08-27 | Stop reason: HOSPADM

## 2019-08-27 RX ORDER — MAGNESIUM OXIDE 400 MG/1
800 TABLET ORAL 2 TIMES DAILY
Status: DISCONTINUED | OUTPATIENT
Start: 2019-08-27 | End: 2019-08-28 | Stop reason: HOSPADM

## 2019-08-27 RX ORDER — FUROSEMIDE 40 MG
40 TABLET ORAL 2 TIMES DAILY
Status: DISCONTINUED | OUTPATIENT
Start: 2019-08-27 | End: 2019-08-28 | Stop reason: HOSPADM

## 2019-08-27 RX ADMIN — SODIUM CHLORIDE: 9 INJECTION, SOLUTION INTRAVENOUS at 12:16

## 2019-08-27 RX ADMIN — Medication 5 MG: at 08:49

## 2019-08-27 RX ADMIN — METFORMIN HYDROCHLORIDE 1000 MG: 500 TABLET, EXTENDED RELEASE ORAL at 18:31

## 2019-08-27 RX ADMIN — PROPOFOL 40 MG: 10 INJECTION, EMULSION INTRAVENOUS at 08:40

## 2019-08-27 RX ADMIN — PHENYLEPHRINE HYDROCHLORIDE 200 MCG: 10 INJECTION INTRAVENOUS at 09:37

## 2019-08-27 RX ADMIN — ATORVASTATIN CALCIUM 80 MG: 80 TABLET, FILM COATED ORAL at 20:47

## 2019-08-27 RX ADMIN — ROCURONIUM BROMIDE 20 MG: 10 INJECTION INTRAVENOUS at 10:24

## 2019-08-27 RX ADMIN — METOPROLOL TARTRATE 25 MG: 25 TABLET ORAL at 20:47

## 2019-08-27 RX ADMIN — SUGAMMADEX 200 MG: 100 INJECTION, SOLUTION INTRAVENOUS at 11:02

## 2019-08-27 RX ADMIN — PROPOFOL 100 MCG/KG/MIN: 10 INJECTION, EMULSION INTRAVENOUS at 10:55

## 2019-08-27 RX ADMIN — ROCURONIUM BROMIDE 30 MG: 10 INJECTION INTRAVENOUS at 09:10

## 2019-08-27 RX ADMIN — ROCURONIUM BROMIDE 50 MG: 10 INJECTION INTRAVENOUS at 08:38

## 2019-08-27 RX ADMIN — MIDAZOLAM 1 MG: 1 INJECTION INTRAMUSCULAR; INTRAVENOUS at 08:28

## 2019-08-27 RX ADMIN — PHENYLEPHRINE HYDROCHLORIDE 100 MCG: 10 INJECTION INTRAVENOUS at 09:38

## 2019-08-27 RX ADMIN — MAGNESIUM OXIDE TAB 400 MG (241.3 MG ELEMENTAL MG) 800 MG: 400 (241.3 MG) TAB at 20:47

## 2019-08-27 RX ADMIN — PROPOFOL 120 MG: 10 INJECTION, EMULSION INTRAVENOUS at 08:38

## 2019-08-27 RX ADMIN — SODIUM CHLORIDE: 9 INJECTION, SOLUTION INTRAVENOUS at 07:58

## 2019-08-27 RX ADMIN — ONDANSETRON 4 MG: 2 INJECTION INTRAMUSCULAR; INTRAVENOUS at 10:39

## 2019-08-27 RX ADMIN — HEPARIN SODIUM 1200 UNITS/HR: 10000 INJECTION, SOLUTION INTRAVENOUS at 21:00

## 2019-08-27 RX ADMIN — SODIUM CHLORIDE: 9 INJECTION, SOLUTION INTRAVENOUS at 20:47

## 2019-08-27 RX ADMIN — PHENYLEPHRINE HYDROCHLORIDE 200 MCG: 10 INJECTION INTRAVENOUS at 09:29

## 2019-08-27 RX ADMIN — FENTANYL CITRATE 50 MCG: 50 INJECTION, SOLUTION INTRAMUSCULAR; INTRAVENOUS at 08:38

## 2019-08-27 RX ADMIN — LIDOCAINE HYDROCHLORIDE 100 MG: 20 INJECTION, SOLUTION INFILTRATION; PERINEURAL at 08:38

## 2019-08-27 RX ADMIN — FENTANYL CITRATE 50 MCG: 50 INJECTION, SOLUTION INTRAMUSCULAR; INTRAVENOUS at 10:24

## 2019-08-27 RX ADMIN — ALLOPURINOL 600 MG: 300 TABLET ORAL at 20:47

## 2019-08-27 RX ADMIN — CEFAZOLIN 2 G: 10 INJECTION, POWDER, FOR SOLUTION INTRAVENOUS at 09:15

## 2019-08-27 RX ADMIN — ROCURONIUM BROMIDE 20 MG: 10 INJECTION INTRAVENOUS at 09:53

## 2019-08-27 RX ADMIN — POTASSIUM CHLORIDE 20 MEQ: 750 CAPSULE, EXTENDED RELEASE ORAL at 20:47

## 2019-08-27 RX ADMIN — OMEPRAZOLE 20 MG: 20 CAPSULE, DELAYED RELEASE ORAL at 20:47

## 2019-08-27 ASSESSMENT — MIFFLIN-ST. JEOR: SCORE: 1863.75

## 2019-08-27 NOTE — PROGRESS NOTES
Patient seen on 3C pre-op for evaluation and iterative programming of his Medtronic dual lead pacemaker per MD orders. Normal pacemaker function. VVIR  bpm. No ventricular arrhythmias recorded. Intrinsic rhythm = AFib with vent rate @ ~ 60-80's bpm.  = 20.3%. Battery voltage = 2.88 V. Lead impedance trends appear stable. Anesthesia MD notified of interrogation results. No programming changes made per MD orders. PRASHANTH Shepherd RN    Dual lead pacemaker    I have reviewed and interpreted the device interrogation, settings, programming and nurse s summary.  The device is functioning within normal device parameters.   I agree with the current findings, assessment and plan.

## 2019-08-27 NOTE — ANESTHESIA PROCEDURE NOTES
Arterial Line Procedure Note  Staff:     Anesthesiologist:  Ivelisse Stephenson MD  Location: In OR After Induction  Procedure Start/Stop Times:     patient identified      Correct Patient: Yes      Correct Position: Yes      Correct Site: Yes      Correct Procedure: Yes      Correct Laterality:  Yes    Site Marked:  Yes  Line Placement:     Procedure:  Arterial Line    Insertion laterality:  Left    Skin Prep: Chloraprep      Patient Prep: patient draped      Local skin infiltration:  None    Ultrasound Guided?: No      Catheter size:  Other (See Comment)    Cath secured with: other (comment)      Dressing:  Tegaderm    Complications:  None obvious    Arterial waveform: Yes      IBP within 10% of NIBP: Yes    Assessment/Narrative:      20 g Jelco

## 2019-08-27 NOTE — ANESTHESIA POSTPROCEDURE EVALUATION
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       Anesthesia Type:  Not documented  General    Reportable Event: NO     PAIN: Uncomplicated   Sign Out status: Comfortable, Well controlled pain     PONV: No PONV   Sign Out status:  No Nausea or Vomiting     Neuro/Psych: Uneventful perioperative course   Sign Out Status: Preoperative baseline; Age appropriate mentation     Airway/Resp.: Uneventful perioperative course   Sign Out Status: Non labored breathing, age appropriate RR; Resp. Status within EXPECTED Parameters     CV: Uneventful perioperative course   Sign Out status: Appropriate BP and perfusion indices; Appropriate HR/Rhythm     Disposition:   Sign Out in:  PACU  Disposition:  Floor  Recovery Course: Uneventful  Follow-Up: Not required           Last Anesthesia Record Vitals:  CRNA VITALS  2019 1027 - 2019 1127      2019             Resp Rate (observed):  2  (Abnormal)           Last PACU Vitals:  Vitals Value Taken Time   /84 2019  1:14 PM   Temp 36.7  C (98.1  F) 2019 11:45 AM   Pulse 66 2019  1:14 PM   Resp 14 2019  1:00 PM   SpO2 93 % 2019  1:21 PM   Temp src     NIBP 124/76 2019 11:00 AM   Pulse     SpO2     Resp     Temp     Ht Rate 0 2019 11:05 AM   Temp 2     Vitals shown include unvalidated device data.      Electronically Signed By: Ivelisse Stephenson MD, 2019, 1:22 PM

## 2019-08-27 NOTE — OP NOTE
PROCEDURE: Percutaneous cryoablation of right renal mass.    PREOPERATIVE DIAGNOSIS: Papillary renal cell carcinoma.  POSTOPERATIVE DIAGNOSIS: Same     INDICATIONS FOR PROCEDURE:  Samir Orlando is a 68 year old male here for percutaneous cryoablation of a renal mass.  He has a solitary kidney and history of Mitral valve repair.  I have previously counseled him on options for treatment of renal masses (observation, ablation, partial nephrectomy, or nephrectomy) as well as risks and benefits of today's procedure.  He gave informed consent for the procedure today.     DRAINS AND TUBES: None  COMPLICATIONS: None.   PRIMARY SURGEON: Tony Garcia MD  SECONDARY SURGEON: Bob mSith MD, Interventional Radiology.   ASSISTANT SURGEON: Raf Maciel MD    DETAILS OF PROCEDURE: The patient was properly identified in preoperative area. He was given preoperative antibiotics and brought to the operating room.  General anesthesia was induced and he was intubated in the standard fashion. He was placed in prone position on the CT scanner table. Sterile prep and drape was done in standard fashion. A timeout was taken.  CT scan was taken to identify the renal mass and plan needle placement.    A total of 4 Galil IceRod cryoprobe needles were placed within the tumor.  We did 2 freeze-thaw cycles. Each freeze cycle was 10 minutes, each thaw cycle was 5 minutes on fast thaw setting. We documented the ice ball at the end of the first and second  freeze cycles by scanning through the kidney.  There was good coverage of the mass with the iceball.  After completion of the procedure and removal of the needles there was no significant bleeding on the follow-up scan.    FOLLOW-UP PLAN: We will plan discharge today.  Clinic follow-up will be in 6 weeks with a CT scan to confirm successful ablation.

## 2019-08-27 NOTE — OR NURSING
Patient has met criteria for extubation. Dr. Stephenson at bedside for extubation. Will cont. to monitor patient.

## 2019-08-27 NOTE — PLAN OF CARE
Observation goals:    Pain control on oral regimen: Patient denies pain at this time.  Ambulation: Patient has ambulated to the restroom x 2 with stand by assist.  Overnight heparin: Scheduled to start at 2100.

## 2019-08-27 NOTE — IR NOTE
Pt. Identified, consent reviewed, escorted to IR CT 2 by anesthesia team. Induction/ intubation done, positioned prone, prepped, time with Dr. Smith for right percutaneous cryoablation of renal mass. To PACU post procedure.

## 2019-08-28 ENCOUNTER — PATIENT OUTREACH (OUTPATIENT)
Dept: CARE COORDINATION | Facility: CLINIC | Age: 69
End: 2019-08-28

## 2019-08-28 VITALS
WEIGHT: 231.48 LBS | SYSTOLIC BLOOD PRESSURE: 118 MMHG | HEIGHT: 73 IN | DIASTOLIC BLOOD PRESSURE: 72 MMHG | HEART RATE: 68 BPM | RESPIRATION RATE: 16 BRPM | BODY MASS INDEX: 30.68 KG/M2 | OXYGEN SATURATION: 95 % | TEMPERATURE: 98.5 F

## 2019-08-28 LAB
ANION GAP SERPL CALCULATED.3IONS-SCNC: 10 MMOL/L (ref 3–14)
BUN SERPL-MCNC: 26 MG/DL (ref 7–30)
CALCIUM SERPL-MCNC: 9.4 MG/DL (ref 8.5–10.1)
CHLORIDE SERPL-SCNC: 100 MMOL/L (ref 94–109)
CO2 SERPL-SCNC: 26 MMOL/L (ref 20–32)
CREAT SERPL-MCNC: 1.41 MG/DL (ref 0.66–1.25)
ERYTHROCYTE [DISTWIDTH] IN BLOOD BY AUTOMATED COUNT: 15.1 % (ref 10–15)
ERYTHROCYTE [DISTWIDTH] IN BLOOD BY AUTOMATED COUNT: 15.3 % (ref 10–15)
GFR SERPL CREATININE-BSD FRML MDRD: 50 ML/MIN/{1.73_M2}
GLUCOSE SERPL-MCNC: 115 MG/DL (ref 70–99)
HCT VFR BLD AUTO: 38.9 % (ref 40–53)
HCT VFR BLD AUTO: 39.6 % (ref 40–53)
HGB BLD-MCNC: 12.4 G/DL (ref 13.3–17.7)
HGB BLD-MCNC: 12.6 G/DL (ref 13.3–17.7)
INR PPP: 1.24 (ref 0.86–1.14)
LMWH PPP CHRO-ACNC: 0.21 IU/ML
MCH RBC QN AUTO: 29.5 PG (ref 26.5–33)
MCH RBC QN AUTO: 30.2 PG (ref 26.5–33)
MCHC RBC AUTO-ENTMCNC: 31.3 G/DL (ref 31.5–36.5)
MCHC RBC AUTO-ENTMCNC: 32.4 G/DL (ref 31.5–36.5)
MCV RBC AUTO: 93 FL (ref 78–100)
MCV RBC AUTO: 94 FL (ref 78–100)
PLATELET # BLD AUTO: 104 10E9/L (ref 150–450)
PLATELET # BLD AUTO: 115 10E9/L (ref 150–450)
POTASSIUM SERPL-SCNC: 3.5 MMOL/L (ref 3.4–5.3)
RBC # BLD AUTO: 4.17 10E12/L (ref 4.4–5.9)
RBC # BLD AUTO: 4.2 10E12/L (ref 4.4–5.9)
SODIUM SERPL-SCNC: 136 MMOL/L (ref 133–144)
WBC # BLD AUTO: 10 10E9/L (ref 4–11)
WBC # BLD AUTO: 9.9 10E9/L (ref 4–11)

## 2019-08-28 PROCEDURE — 85520 HEPARIN ASSAY: CPT | Performed by: UROLOGY

## 2019-08-28 PROCEDURE — 25000132 ZZH RX MED GY IP 250 OP 250 PS 637: Performed by: STUDENT IN AN ORGANIZED HEALTH CARE EDUCATION/TRAINING PROGRAM

## 2019-08-28 PROCEDURE — 36415 COLL VENOUS BLD VENIPUNCTURE: CPT | Performed by: UROLOGY

## 2019-08-28 PROCEDURE — 96372 THER/PROPH/DIAG INJ SC/IM: CPT

## 2019-08-28 PROCEDURE — 85027 COMPLETE CBC AUTOMATED: CPT | Performed by: STUDENT IN AN ORGANIZED HEALTH CARE EDUCATION/TRAINING PROGRAM

## 2019-08-28 PROCEDURE — 25800030 ZZH RX IP 258 OP 636: Performed by: STUDENT IN AN ORGANIZED HEALTH CARE EDUCATION/TRAINING PROGRAM

## 2019-08-28 PROCEDURE — G0378 HOSPITAL OBSERVATION PER HR: HCPCS

## 2019-08-28 PROCEDURE — 85610 PROTHROMBIN TIME: CPT | Performed by: STUDENT IN AN ORGANIZED HEALTH CARE EDUCATION/TRAINING PROGRAM

## 2019-08-28 PROCEDURE — 25000128 H RX IP 250 OP 636: Performed by: PHYSICIAN ASSISTANT

## 2019-08-28 PROCEDURE — 80048 BASIC METABOLIC PNL TOTAL CA: CPT | Performed by: STUDENT IN AN ORGANIZED HEALTH CARE EDUCATION/TRAINING PROGRAM

## 2019-08-28 PROCEDURE — 85520 HEPARIN ASSAY: CPT | Performed by: STUDENT IN AN ORGANIZED HEALTH CARE EDUCATION/TRAINING PROGRAM

## 2019-08-28 RX ADMIN — MAGNESIUM OXIDE TAB 400 MG (241.3 MG ELEMENTAL MG) 800 MG: 400 (241.3 MG) TAB at 07:39

## 2019-08-28 RX ADMIN — MELATONIN 1000 UNITS: at 07:39

## 2019-08-28 RX ADMIN — POTASSIUM CHLORIDE 20 MEQ: 750 CAPSULE, EXTENDED RELEASE ORAL at 07:39

## 2019-08-28 RX ADMIN — METOPROLOL TARTRATE 25 MG: 25 TABLET ORAL at 07:39

## 2019-08-28 RX ADMIN — SODIUM CHLORIDE: 9 INJECTION, SOLUTION INTRAVENOUS at 07:42

## 2019-08-28 RX ADMIN — LISINOPRIL 40 MG: 40 TABLET ORAL at 07:40

## 2019-08-28 RX ADMIN — ENOXAPARIN SODIUM 100 MG: 100 INJECTION SUBCUTANEOUS at 10:22

## 2019-08-28 RX ADMIN — METFORMIN HYDROCHLORIDE 1000 MG: 500 TABLET, EXTENDED RELEASE ORAL at 07:39

## 2019-08-28 RX ADMIN — FUROSEMIDE 40 MG: 40 TABLET ORAL at 07:39

## 2019-08-28 NOTE — PLAN OF CARE
Outpatient/Observation goals to be met before discharge home:    PRIMARY DIAGNOSIS: Renal Tumor cryoablation  OUTPATIENT/OBSERVATION GOALS TO BE MET BEFORE DISCHARGE:  Stable Hgb: YES-labs ordered in AM for monitoring.  Tolerating diet: YES-pt tolerating regular diet without any issues.  Pain control: YES-pt states no pain; will continue to monitor.  Ambulating: YES-pt SBA while using cane with IV pole.  *Nurse to notify MD when observation goals have been met and patient is ready for discharge.

## 2019-08-28 NOTE — PROGRESS NOTES
"Urology  Progress Note    - NAEON  - denies chest pain, SOB  - no abdominal pain    Exam  /81 (BP Location: Right arm)   Pulse 68   Temp 98.3  F (36.8  C) (Oral)   Resp 16   Ht 1.854 m (6' 0.99\")   Wt 105 kg (231 lb 7.7 oz)   SpO2 97%   BMI 30.55 kg/m    No acute distress  Unlabored breathing  Abdomen soft, nontender, nondistended.     /150 + mult unmeasured    Labs  WBC 10.0 (9.9)  Hgb 12.4 (12.6)  Crt 1.4  INR pending    Assessment/Plan  68 year old y/o male POD#1 s/p cryoablation of renal mass. Admitted for bridging of anticoagulation.    Neuro: tylenol, dilaudid, oxycodone for pain control  CV: HDS. On telemetry, no events overnight.   Pulm: incentive spirometry while awake  FEN/GI: regular diet, MIVF @ 100/hr.  Endo: RAUL  : Voiding independently. Monitor urine output.   Heme: Hgb stable. INR pending. Will d'c heparin gtt and initiate bridge with Lovenox today.   ID: afebrile, no leukocystosis. Completed denise-op abx.  Activity: up ad meri  PPx: SCDs. Encourage ambulation  Dispo: home     Seen and examined with the chief resident. Will discuss with Dr. Garcia.    Sherice Root MD  Urology Resident     Contacting the Urology Team     Please use the following job codes to reach the Urology Team. Note that you must use an in house phone and that job codes cannot receive text pages.     On weekdays, dial [phone] (or star-star-star [phone] on the new [employer] telephones) then [phone] to reach the Adult Urology resident or PA on call    On weekdays, dial [phone] (or star-star-star [phone] on the new [employer] telephones) then [phone] to reach the Pediatric Urology resident    On weeknights and weekends, dial [phone] (or star-star-star [phone] on the new [employer] telephones) then [phone] to reach the Urology resident on call (for both Adult and Pediatrics)              "

## 2019-08-28 NOTE — PLAN OF CARE
Outpatient/Observation goals to be met before discharge home:     PRIMARY DIAGNOSIS: Renal Tumor Cryoablation  OUTPATIENT/OBSERVATION GOALS TO BE MET BEFORE DISCHARGE:  Stable Hgb: YES  Tolerating diet: YES-pt tolerating regular diet without any issues.  Pain control: YES-pt states no pain; will continue to monitor.  Ambulating: YES  *Nurse to notify MD when observation goals have been met and patient is ready for discharge.

## 2019-08-28 NOTE — PLAN OF CARE
DISCHARGE                         8/28/2019 10:45 AM  ----------------------------------------------------------------------------  Discharged to: Home  Via: private transportation  Accompanied by: Family  Discharge Instructions: regular diet, activity restrictions, medications, follow up appointments, when to call the MD, aftercare instructions.  Prescriptions: no new meds; medication list reviewed & sent with pt  Follow Up Appointments: arranged; information given  Belongings: All sent with pt  IV: d/c'd  Telemetry: d/c'd  Pt exhibits understanding of above discharge instructions; all questions answered.    Discharge Paperwork: Signed, copied, and sent home with patient.

## 2019-08-28 NOTE — DISCHARGE SUMMARY
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, Right    Past Medical History:   Diagnosis Date     Atrial fibrillation (H)      CAD (coronary artery disease)     s/ CABG x 1, stents x3     Cholelithiasis      Diverticulosis      DM II (diabetes mellitus, type II), controlled (H)      Mitral valve disease     s/p MVR     Renal cell carcinoma (H)      Sick sinus syndrome (H)     pacemaker     Subdural hematoma (H)     s/p craniotomy          Discharge Diagnosis:   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 Tony Garcia (Urology)            Medications Prior to Admission:     Medications Prior to Admission   Medication Sig Dispense Refill Last Dose     allopurinol (ZYLOPRIM) 100 MG tablet Take 600 mg by mouth every evening    2019 at Unknown time     ATORVASTATIN CALCIUM PO Take 80 mg by mouth At Bedtime    2019 at Unknown time     cholecalciferol 1000 units TABS Take 1,000 Units by mouth daily   2019 at Unknown time     enoxaparin (LOVENOX) 100 MG/ML syringe Inject 100 mg Subcutaneous 2 times daily        Furosemide (LASIX PO) Take 40 mg by mouth 2 times daily   2019 at Unknown time      LISINOPRIL PO Take 40 mg by mouth every morning    8/26/2019 at Unknown time     Magnesium Oxide 420 MG TABS Take 840 mg by mouth 2 times daily    8/26/2019 at Unknown time     metFORMIN (GLUCOPHAGE-XR) 500 MG 24 hr tablet Take 1,000 mg by mouth 2 times daily (with meals)   8/26/2019 at Unknown time     METOPROLOL TARTRATE PO Take 25 mg by mouth 2 times daily    8/27/2019 at Unknown time     OMEPRAZOLE PO Take 20 mg by mouth At Bedtime    8/27/2019 at Unknown time     potassium chloride ER (MICRO-K) 10 MEQ CR capsule Take 20 mEq by mouth 2 times daily    8/26/2019 at Unknown time     warfarin (COUMADIN) 1 MG tablet Take 6mg on Monday, take 8mg the rest of the week 30 tablet 0 8/22/2019 at Unknown time             Discharge Medications:     Current Discharge Medication List      CONTINUE these medications which have NOT CHANGED    Details   allopurinol (ZYLOPRIM) 100 MG tablet Take 600 mg by mouth every evening       ATORVASTATIN CALCIUM PO Take 80 mg by mouth At Bedtime       cholecalciferol 1000 units TABS Take 1,000 Units by mouth daily      enoxaparin (LOVENOX) 100 MG/ML syringe Inject 100 mg Subcutaneous 2 times daily      Furosemide (LASIX PO) Take 40 mg by mouth 2 times daily      LISINOPRIL PO Take 40 mg by mouth every morning       Magnesium Oxide 420 MG TABS Take 840 mg by mouth 2 times daily       metFORMIN (GLUCOPHAGE-XR) 500 MG 24 hr tablet Take 1,000 mg by mouth 2 times daily (with meals)      METOPROLOL TARTRATE PO Take 25 mg by mouth 2 times daily       OMEPRAZOLE PO Take 20 mg by mouth At Bedtime       potassium chloride ER (MICRO-K) 10 MEQ CR capsule Take 20 mEq by mouth 2 times daily       warfarin (COUMADIN) 1 MG tablet Take 6mg on Monday, take 8mg the rest of the week  Qty: 30 tablet, Refills: 0    Associated Diagnoses: Subdural hematoma (H)                   Consultations:   Consultation during this admission received:   None          Brief History of Illness:   Reason for admission requiring a  surgical or invasive procedure:   Malignant Neoplasm of Kidney Excluding Renal Pelvis, Right   The patient underwent the following procedure(s):   See above   There were no immediate complications during this procedure.    Please refer to the full operative summary for details.           Hospital Course:   The patient's hospital course was unremarkable.  Samir Orlando recovered as anticipated and experienced no post-operative complications.     Following surgery he was begun on a low intensity heparin drip and kept overnight for monitoring.  On the morning of POD #1 hemoglobin was stable (12.4g/dL), other labs were WNL and the patient endorsed minimal pain. He was ambulating without assitance, tolerating the discharge diet, had pain controlled with PO medications to go home with, and was requiring no IV medications or fluids. He was discharged to home with appropriate contact information, follow-up and instructions as seen below in the discharge paperwork.         Discharge Labs:     No results found for: PSA  Recent Labs   Lab 08/28/19  0357 08/28/19  0004 08/27/19  0706   WBC 10.0 9.9 8.4   HGB 12.4* 12.6* 14.1   * 115* 142*     Recent Labs   Lab 08/28/19  0357 08/27/19  0706    135   POTASSIUM 3.5 3.3*   CHLORIDE 100 97   CO2 26 27   BUN 26 29   CR 1.41* 1.33*   * 128*   CLARI 9.4 10.4*     Recent Labs   Lab 08/23/19  1130   COLOR Light Yellow   APPEARANCE Clear   URINEGLC 150*   URINEBILI Negative   URINEKETONE Negative   SG 1.009   URINEPH 6.5   PROTEIN Negative   NITRITE Negative   LEUKEST Negative   RBCU <1   WBCU <1     No results found for this or any previous visit.    Results for orders placed or performed during the hospital encounter of 08/27/19   CT Renal Tumor Cryoablation    Narrative    Procedures: CT-guided cryoablation of right renal mass, 8/27/2019.    Clinical indication: Solitary right kidney. Ipsilateral renal cell  carcinoma, measuring approximately 3 cm.    Comparison  studies: 4/3/2019    PROCEDURE:        Interventional radiologists: Bob Smith MD (IR staff)    Fellow: Raf Maciel MD    Staff Urologist: Moises Garcia MD    Consent: verbal and written informed consent obtained prior to  procedure.    Procedure details: Patient placed in the prone position. General  endotracheal anesthesia was smoothly administered. The right flank was  prepped and draped in standard sterile fashion. Preprocedural CT scan  demonstrated adequate percutaneous window for kidney tumor  cryoablation. Using CT guidance, 4 BTG cryoablation probes were  advanced into the mass. The first 3 probes were oriented in the  sagittal plane, from superior to inferior. The final probe was  oriented in the coronal plane from a posteroanterior approach. Limited  diagnostic CT scan was obtained to confirm satisfactory positioning of  the needles. Subsequently, the lesion was treated with two freeze-thaw  cycles.  Postprocedural limited diagnostic CT scan confirmed adequate  covering of the cryoablation zone. The procedure was then terminated.  The probes were removed. Sterile dressings applied. No immediate  complication.     Medications: Please see anesthesiology documentation for medications  administered.     Monitoring: The patient was placed on continuous monitoring. Vital  signs were monitored by anesthesiology staff. The patient remained  stable throughout the procedure.    Sedation time: Not applicable    Complications: None.      Impression    IMPRESSION:     CT-guided cryoablation of right renal mass. No immediate complication.    PLAN:    The patient will be admitted for observation to the urology service.   Glucose by meter   Result Value Ref Range    Glucose 135 (H) 70 - 99 mg/dL   INR   Result Value Ref Range    INR 1.20 (H) 0.86 - 1.14   CBC with platelets   Result Value Ref Range    WBC 8.4 4.0 - 11.0 10e9/L    RBC Count 4.66 4.4 - 5.9 10e12/L    Hemoglobin 14.1 13.3 - 17.7 g/dL    Hematocrit  43.1 40.0 - 53.0 %    MCV 93 78 - 100 fl    MCH 30.3 26.5 - 33.0 pg    MCHC 32.7 31.5 - 36.5 g/dL    RDW 15.4 (H) 10.0 - 15.0 %    Platelet Count 142 (L) 150 - 450 10e9/L   Partial thromboplastin time   Result Value Ref Range    PTT 35 22 - 37 sec   Basic metabolic panel   Result Value Ref Range    Sodium 135 133 - 144 mmol/L    Potassium 3.3 (L) 3.4 - 5.3 mmol/L    Chloride 97 94 - 109 mmol/L    Carbon Dioxide 27 20 - 32 mmol/L    Anion Gap 11 3 - 14 mmol/L    Glucose 128 (H) 70 - 99 mg/dL    Urea Nitrogen 29 7 - 30 mg/dL    Creatinine 1.33 (H) 0.66 - 1.25 mg/dL    GFR Estimate 53 (L) >60 mL/min/[1.73_m2]    GFR Estimate If Black 62 >60 mL/min/[1.73_m2]    Calcium 10.4 (H) 8.5 - 10.1 mg/dL   CBC with platelets   Result Value Ref Range    WBC 9.9 4.0 - 11.0 10e9/L    RBC Count 4.17 (L) 4.4 - 5.9 10e12/L    Hemoglobin 12.6 (L) 13.3 - 17.7 g/dL    Hematocrit 38.9 (L) 40.0 - 53.0 %    MCV 93 78 - 100 fl    MCH 30.2 26.5 - 33.0 pg    MCHC 32.4 31.5 - 36.5 g/dL    RDW 15.3 (H) 10.0 - 15.0 %    Platelet Count 115 (L) 150 - 450 10e9/L   Basic metabolic panel   Result Value Ref Range    Sodium 136 133 - 144 mmol/L    Potassium 3.5 3.4 - 5.3 mmol/L    Chloride 100 94 - 109 mmol/L    Carbon Dioxide 26 20 - 32 mmol/L    Anion Gap 10 3 - 14 mmol/L    Glucose 115 (H) 70 - 99 mg/dL    Urea Nitrogen 26 7 - 30 mg/dL    Creatinine 1.41 (H) 0.66 - 1.25 mg/dL    GFR Estimate 50 (L) >60 mL/min/[1.73_m2]    GFR Estimate If Black 58 (L) >60 mL/min/[1.73_m2]    Calcium 9.4 8.5 - 10.1 mg/dL   CBC with platelets   Result Value Ref Range    WBC 10.0 4.0 - 11.0 10e9/L    RBC Count 4.20 (L) 4.4 - 5.9 10e12/L    Hemoglobin 12.4 (L) 13.3 - 17.7 g/dL    Hematocrit 39.6 (L) 40.0 - 53.0 %    MCV 94 78 - 100 fl    MCH 29.5 26.5 - 33.0 pg    MCHC 31.3 (L) 31.5 - 36.5 g/dL    RDW 15.1 (H) 10.0 - 15.0 %    Platelet Count 104 (L) 150 - 450 10e9/L   Heparin 10a Level   Result Value Ref Range    Heparin 10A Level 0.21 IU/mL   INR   Result Value Ref Range     INR 1.24 (H) 0.86 - 1.14            Discharge Instructions and Follow-Up:   Discharge Diet:   - Regular/ home diet    Activity:   - No strenuous exercise for 2-4 weeks.   - No lifting, pushing, pulling more than 10 pounds for 2-4 weeks. Take care when pushing with your arms to stand up.   - Do not strain with bowel movements.    - Do not drive until you can press the brake pedal quickly and fully without pain.     Medications:   1) PAIN: Tylenol   2) You may resume Warfarin right away (depending on recommendations from your warfarin clinic).  Lovenox bridging can also begin immediately as directed by anticoagulation clinic      Wound Care:   - You may shower and get incisions wet starting 48 hrs after surgery.  - Do not scrub incisions or submerge wounds (aka, bath, pool, hot tub, etc.) for 2 weeks or until wounds have healed and catheter is removed.  - Remove wound dressing 48 hours after surgery if already not removed.   - If purple dermabond glue was used, avoid applying any lotions or ointments.   - Leave incision open to air.  Cover with gauze only if needed for comfort or to protect clothing from drainage.     Follow-Up:   - Schedule an appointment to be seen by your primary care provider within 7-10 days of discharge for a postoperative checkup.   - Follow closely with warfarin to monitor INRs.  Your lovenox will need to be stopped once INR is therapeutic.   - Follow up with Dr. Garcia as scheduled  - Call or return sooner than your regularly scheduled visit if you develop any of the following:  Fever (greater than 101.3F), uncontrolled pain, uncontrolled nausea or vomiting, concerns about bowel function, as well as increased redness, swelling, drainage from your wound or any concerns about urinating or urinary catheter drainage.    - Blood in the urine can be seen following this type of procedure.  Increase fluid intake if you see blood to keep urine light pink or light yellow.  Call Urology with  "thickening red urine, large blood clots or difficulty urinating.  Seek medical attention for worsening flank pain, dizziness or shortness of breath.     Here are some phone numbers where you can reach us:  - Monday through Friday, 8am to 4:30pm - as long as it is not a holiday, IT IS BEST to call the Urology Clinic Triage Line at: [phone] with any non-emergent urology concerns.    - If it is a night, weekend, or holiday call the [facility] number at [phone] and ask the  to page the \"urology resident on call\".  Typically, the on-call provider should return your call within 30 minutes.  Please page the on-call provider again if you haven't been contacted as expected.  Rarely, the on-call provider will be unable to promptly return a call due to a hospital emergency.  If you have paged twice and are still not contacted, ask the hospital  to page the \"urology CHIEF-RESIDENT on call\".  - FOR EMERGENCIES, always call 911 or go to the Emergency Department         Discharge Disposition:   Discharged to home      Attestation: I have reviewed today's vital signs, notes, medications, labs and imaging.    Mackenzie Kirby PA-C  Urology Physician Assistant  Personal Pager: [phone]    Please call Job Code:   [phone] to reach the Urology resident or PA on call - Weekdays  [phone] to reach the Urology resident or PA on call - Weeknights and weekends    August 28, 2019         "

## 2019-08-29 LAB
MDC_IDC_LEAD_IMPLANT_DT: NORMAL
MDC_IDC_LEAD_IMPLANT_DT: NORMAL
MDC_IDC_LEAD_LOCATION: NORMAL
MDC_IDC_LEAD_LOCATION: NORMAL
MDC_IDC_LEAD_LOCATION_DETAIL_1: NORMAL
MDC_IDC_LEAD_LOCATION_DETAIL_1: NORMAL
MDC_IDC_LEAD_MFG: NORMAL
MDC_IDC_LEAD_MFG: NORMAL
MDC_IDC_LEAD_MODEL: NORMAL
MDC_IDC_LEAD_MODEL: NORMAL
MDC_IDC_LEAD_POLARITY_TYPE: NORMAL
MDC_IDC_LEAD_POLARITY_TYPE: NORMAL
MDC_IDC_LEAD_SERIAL: NORMAL
MDC_IDC_LEAD_SERIAL: NORMAL
MDC_IDC_MSMT_BATTERY_DTM: NORMAL
MDC_IDC_MSMT_BATTERY_STATUS: NORMAL
MDC_IDC_MSMT_BATTERY_VOLTAGE: 2.88 V
MDC_IDC_MSMT_LEADCHNL_RA_IMPEDANCE_VALUE: 432 OHM
MDC_IDC_MSMT_LEADCHNL_RA_SENSING_INTR_AMPL: 0.91 MV
MDC_IDC_MSMT_LEADCHNL_RV_IMPEDANCE_VALUE: 776 OHM
MDC_IDC_MSMT_LEADCHNL_RV_PACING_THRESHOLD_AMPLITUDE: 1 V
MDC_IDC_MSMT_LEADCHNL_RV_PACING_THRESHOLD_PULSEWIDTH: 0.6 MS
MDC_IDC_MSMT_LEADCHNL_RV_SENSING_INTR_AMPL: 16.93 MV
MDC_IDC_PG_MFG: NORMAL
MDC_IDC_PG_MODEL: NORMAL
MDC_IDC_PG_SERIAL: NORMAL
MDC_IDC_PG_TYPE: NORMAL
MDC_IDC_SESS_CLINIC_NAME: NORMAL
MDC_IDC_SESS_DTM: NORMAL
MDC_IDC_SESS_TYPE: NORMAL
MDC_IDC_SET_BRADY_HYSTRATE: NORMAL
MDC_IDC_SET_BRADY_LOWRATE: 50 {BEATS}/MIN
MDC_IDC_SET_BRADY_MAX_SENSOR_RATE: 140 {BEATS}/MIN
MDC_IDC_SET_BRADY_MODE: NORMAL
MDC_IDC_SET_LEADCHNL_RA_PACING_ANODE_ELECTRODE_1: NORMAL
MDC_IDC_SET_LEADCHNL_RA_PACING_ANODE_LOCATION_1: NORMAL
MDC_IDC_SET_LEADCHNL_RA_PACING_CATHODE_ELECTRODE_1: NORMAL
MDC_IDC_SET_LEADCHNL_RA_PACING_CATHODE_LOCATION_1: NORMAL
MDC_IDC_SET_LEADCHNL_RA_SENSING_ANODE_ELECTRODE_1: NORMAL
MDC_IDC_SET_LEADCHNL_RA_SENSING_ANODE_LOCATION_1: NORMAL
MDC_IDC_SET_LEADCHNL_RA_SENSING_CATHODE_ELECTRODE_1: NORMAL
MDC_IDC_SET_LEADCHNL_RA_SENSING_CATHODE_LOCATION_1: NORMAL
MDC_IDC_SET_LEADCHNL_RA_SENSING_POLARITY: NORMAL
MDC_IDC_SET_LEADCHNL_RA_SENSING_SENSITIVITY: 0.15 MV
MDC_IDC_SET_LEADCHNL_RV_PACING_AMPLITUDE: 3 V
MDC_IDC_SET_LEADCHNL_RV_PACING_ANODE_ELECTRODE_1: NORMAL
MDC_IDC_SET_LEADCHNL_RV_PACING_ANODE_LOCATION_1: NORMAL
MDC_IDC_SET_LEADCHNL_RV_PACING_CATHODE_ELECTRODE_1: NORMAL
MDC_IDC_SET_LEADCHNL_RV_PACING_CATHODE_LOCATION_1: NORMAL
MDC_IDC_SET_LEADCHNL_RV_PACING_POLARITY: NORMAL
MDC_IDC_SET_LEADCHNL_RV_PACING_PULSEWIDTH: 0.6 MS
MDC_IDC_SET_LEADCHNL_RV_SENSING_ANODE_ELECTRODE_1: NORMAL
MDC_IDC_SET_LEADCHNL_RV_SENSING_ANODE_LOCATION_1: NORMAL
MDC_IDC_SET_LEADCHNL_RV_SENSING_CATHODE_ELECTRODE_1: NORMAL
MDC_IDC_SET_LEADCHNL_RV_SENSING_CATHODE_LOCATION_1: NORMAL
MDC_IDC_SET_LEADCHNL_RV_SENSING_POLARITY: NORMAL
MDC_IDC_SET_LEADCHNL_RV_SENSING_SENSITIVITY: 0.9 MV
MDC_IDC_SET_ZONE_DETECTION_INTERVAL: 420 MS
MDC_IDC_SET_ZONE_DETECTION_INTERVAL: 450 MS
MDC_IDC_SET_ZONE_TYPE: NORMAL
MDC_IDC_STAT_AT_DTM_END: NORMAL
MDC_IDC_STAT_AT_DTM_START: NORMAL
MDC_IDC_STAT_BRADY_DTM_END: NORMAL
MDC_IDC_STAT_BRADY_DTM_START: NORMAL
MDC_IDC_STAT_BRADY_RV_PERCENT_PACED: 20.26 %
MDC_IDC_STAT_EPISODE_RECENT_COUNT: 0
MDC_IDC_STAT_EPISODE_RECENT_COUNT_DTM_END: NORMAL
MDC_IDC_STAT_EPISODE_RECENT_COUNT_DTM_START: NORMAL
MDC_IDC_STAT_EPISODE_TOTAL_COUNT: 1
MDC_IDC_STAT_EPISODE_TOTAL_COUNT: 12
MDC_IDC_STAT_EPISODE_TOTAL_COUNT: 41
MDC_IDC_STAT_EPISODE_TOTAL_COUNT: NORMAL
MDC_IDC_STAT_EPISODE_TOTAL_COUNT_DTM_END: NORMAL
MDC_IDC_STAT_EPISODE_TOTAL_COUNT_DTM_START: NORMAL
MDC_IDC_STAT_EPISODE_TYPE: NORMAL

## 2019-09-09 LAB
MDC_IDC_EPISODE_DTM: NORMAL
MDC_IDC_EPISODE_DURATION: 127 S
MDC_IDC_EPISODE_DURATION: 142 S
MDC_IDC_EPISODE_DURATION: 170 S
MDC_IDC_EPISODE_DURATION: 20 S
MDC_IDC_EPISODE_DURATION: 202 S
MDC_IDC_EPISODE_DURATION: 223 S
MDC_IDC_EPISODE_DURATION: 225 S
MDC_IDC_EPISODE_DURATION: 240 S
MDC_IDC_EPISODE_DURATION: 279 S
MDC_IDC_EPISODE_DURATION: 285 S
MDC_IDC_EPISODE_DURATION: 29 S
MDC_IDC_EPISODE_DURATION: 354 S
MDC_IDC_EPISODE_DURATION: 362 S
MDC_IDC_EPISODE_DURATION: 42 S
MDC_IDC_EPISODE_DURATION: 47 S
MDC_IDC_EPISODE_DURATION: 67 S
MDC_IDC_EPISODE_DURATION: 67 S
MDC_IDC_EPISODE_DURATION: 758 S
MDC_IDC_EPISODE_DURATION: NORMAL S
MDC_IDC_EPISODE_ID: NORMAL
MDC_IDC_EPISODE_TYPE: NORMAL
MDC_IDC_LEAD_IMPLANT_DT: NORMAL
MDC_IDC_LEAD_IMPLANT_DT: NORMAL
MDC_IDC_LEAD_LOCATION: NORMAL
MDC_IDC_LEAD_LOCATION: NORMAL
MDC_IDC_LEAD_LOCATION_DETAIL_1: NORMAL
MDC_IDC_LEAD_LOCATION_DETAIL_1: NORMAL
MDC_IDC_LEAD_MFG: NORMAL
MDC_IDC_LEAD_MFG: NORMAL
MDC_IDC_LEAD_MODEL: NORMAL
MDC_IDC_LEAD_MODEL: NORMAL
MDC_IDC_LEAD_POLARITY_TYPE: NORMAL
MDC_IDC_LEAD_POLARITY_TYPE: NORMAL
MDC_IDC_LEAD_SERIAL: NORMAL
MDC_IDC_LEAD_SERIAL: NORMAL
MDC_IDC_MSMT_BATTERY_DTM: NORMAL
MDC_IDC_MSMT_BATTERY_STATUS: NORMAL
MDC_IDC_MSMT_BATTERY_VOLTAGE: 2.88 V
MDC_IDC_MSMT_LEADCHNL_RA_IMPEDANCE_VALUE: 440 OHM
MDC_IDC_MSMT_LEADCHNL_RA_SENSING_INTR_AMPL: 0.86 MV
MDC_IDC_MSMT_LEADCHNL_RV_IMPEDANCE_VALUE: 792 OHM
MDC_IDC_MSMT_LEADCHNL_RV_PACING_THRESHOLD_AMPLITUDE: 1 V
MDC_IDC_MSMT_LEADCHNL_RV_PACING_THRESHOLD_PULSEWIDTH: 0.6 MS
MDC_IDC_MSMT_LEADCHNL_RV_SENSING_INTR_AMPL: 19.64 MV
MDC_IDC_PG_MFG: NORMAL
MDC_IDC_PG_MODEL: NORMAL
MDC_IDC_PG_SERIAL: NORMAL
MDC_IDC_PG_TYPE: NORMAL
MDC_IDC_SESS_CLINIC_NAME: NORMAL
MDC_IDC_SESS_DTM: NORMAL
MDC_IDC_SESS_TYPE: NORMAL
MDC_IDC_SET_BRADY_HYSTRATE: NORMAL
MDC_IDC_SET_BRADY_LOWRATE: 50 {BEATS}/MIN
MDC_IDC_SET_BRADY_MAX_SENSOR_RATE: 140 {BEATS}/MIN
MDC_IDC_SET_BRADY_MODE: NORMAL
MDC_IDC_SET_LEADCHNL_RA_PACING_ANODE_ELECTRODE_1: NORMAL
MDC_IDC_SET_LEADCHNL_RA_PACING_ANODE_LOCATION_1: NORMAL
MDC_IDC_SET_LEADCHNL_RA_PACING_CATHODE_ELECTRODE_1: NORMAL
MDC_IDC_SET_LEADCHNL_RA_PACING_CATHODE_LOCATION_1: NORMAL
MDC_IDC_SET_LEADCHNL_RA_SENSING_ANODE_ELECTRODE_1: NORMAL
MDC_IDC_SET_LEADCHNL_RA_SENSING_ANODE_LOCATION_1: NORMAL
MDC_IDC_SET_LEADCHNL_RA_SENSING_CATHODE_ELECTRODE_1: NORMAL
MDC_IDC_SET_LEADCHNL_RA_SENSING_CATHODE_LOCATION_1: NORMAL
MDC_IDC_SET_LEADCHNL_RA_SENSING_POLARITY: NORMAL
MDC_IDC_SET_LEADCHNL_RA_SENSING_SENSITIVITY: 2.1 MV
MDC_IDC_SET_LEADCHNL_RV_PACING_AMPLITUDE: 3 V
MDC_IDC_SET_LEADCHNL_RV_PACING_ANODE_ELECTRODE_1: NORMAL
MDC_IDC_SET_LEADCHNL_RV_PACING_ANODE_LOCATION_1: NORMAL
MDC_IDC_SET_LEADCHNL_RV_PACING_CATHODE_ELECTRODE_1: NORMAL
MDC_IDC_SET_LEADCHNL_RV_PACING_CATHODE_LOCATION_1: NORMAL
MDC_IDC_SET_LEADCHNL_RV_PACING_POLARITY: NORMAL
MDC_IDC_SET_LEADCHNL_RV_PACING_PULSEWIDTH: 0.6 MS
MDC_IDC_SET_LEADCHNL_RV_SENSING_ANODE_ELECTRODE_1: NORMAL
MDC_IDC_SET_LEADCHNL_RV_SENSING_ANODE_LOCATION_1: NORMAL
MDC_IDC_SET_LEADCHNL_RV_SENSING_CATHODE_ELECTRODE_1: NORMAL
MDC_IDC_SET_LEADCHNL_RV_SENSING_CATHODE_LOCATION_1: NORMAL
MDC_IDC_SET_LEADCHNL_RV_SENSING_POLARITY: NORMAL
MDC_IDC_SET_LEADCHNL_RV_SENSING_SENSITIVITY: 0.9 MV
MDC_IDC_SET_ZONE_DETECTION_INTERVAL: 420 MS
MDC_IDC_SET_ZONE_DETECTION_INTERVAL: 450 MS
MDC_IDC_SET_ZONE_TYPE: NORMAL
MDC_IDC_STAT_AT_BURDEN_PERCENT: 0 %
MDC_IDC_STAT_AT_DTM_END: NORMAL
MDC_IDC_STAT_AT_DTM_START: NORMAL
MDC_IDC_STAT_BRADY_DTM_END: NORMAL
MDC_IDC_STAT_BRADY_DTM_START: NORMAL
MDC_IDC_STAT_BRADY_RV_PERCENT_PACED: 19.54 %
MDC_IDC_STAT_EPISODE_RECENT_COUNT: 0
MDC_IDC_STAT_EPISODE_RECENT_COUNT: 18
MDC_IDC_STAT_EPISODE_RECENT_COUNT_DTM_END: NORMAL
MDC_IDC_STAT_EPISODE_RECENT_COUNT_DTM_START: NORMAL
MDC_IDC_STAT_EPISODE_TOTAL_COUNT: 1
MDC_IDC_STAT_EPISODE_TOTAL_COUNT: 12
MDC_IDC_STAT_EPISODE_TOTAL_COUNT: 41
MDC_IDC_STAT_EPISODE_TOTAL_COUNT: NORMAL
MDC_IDC_STAT_EPISODE_TOTAL_COUNT_DTM_END: NORMAL
MDC_IDC_STAT_EPISODE_TOTAL_COUNT_DTM_START: NORMAL
MDC_IDC_STAT_EPISODE_TYPE: NORMAL

## 2019-09-13 ENCOUNTER — PRE VISIT (OUTPATIENT)
Dept: UROLOGY | Facility: CLINIC | Age: 69
End: 2019-09-13

## 2019-09-13 NOTE — TELEPHONE ENCOUNTER
Post op.  S/P Percutaneous cryoablation of right renal mass on 8-27-19.  Imaging prior.  Records available in [facility].

## 2019-10-04 ENCOUNTER — HEALTH MAINTENANCE LETTER (OUTPATIENT)
Age: 69
End: 2019-10-04

## 2019-10-11 ENCOUNTER — ANCILLARY PROCEDURE (OUTPATIENT)
Dept: CT IMAGING | Facility: CLINIC | Age: 69
End: 2019-10-11
Attending: UROLOGY
Payer: COMMERCIAL

## 2019-10-11 ENCOUNTER — OFFICE VISIT (OUTPATIENT)
Dept: UROLOGY | Facility: CLINIC | Age: 69
End: 2019-10-11
Payer: COMMERCIAL

## 2019-10-11 VITALS
DIASTOLIC BLOOD PRESSURE: 70 MMHG | BODY MASS INDEX: 29.12 KG/M2 | HEIGHT: 74 IN | SYSTOLIC BLOOD PRESSURE: 116 MMHG | WEIGHT: 226.9 LBS | HEART RATE: 53 BPM

## 2019-10-11 DIAGNOSIS — C64.1 MALIGNANT NEOPLASM OF KIDNEY EXCLUDING RENAL PELVIS, RIGHT (H): Primary | ICD-10-CM

## 2019-10-11 DIAGNOSIS — C64.1 MALIGNANT NEOPLASM OF KIDNEY EXCLUDING RENAL PELVIS, RIGHT (H): ICD-10-CM

## 2019-10-11 PROBLEM — N28.89 RENAL MASS: Status: RESOLVED | Noted: 2019-08-27 | Resolved: 2019-10-11

## 2019-10-11 LAB
CREAT BLD-MCNC: 1.7 MG/DL (ref 0.66–1.25)
GFR SERPL CREATININE-BSD FRML MDRD: 40 ML/MIN/{1.73_M2}

## 2019-10-11 RX ORDER — IOPAMIDOL 755 MG/ML
135 INJECTION, SOLUTION INTRAVASCULAR ONCE
Status: COMPLETED | OUTPATIENT
Start: 2019-10-11 | End: 2019-10-11

## 2019-10-11 RX ADMIN — IOPAMIDOL 135 ML: 755 INJECTION, SOLUTION INTRAVASCULAR at 12:08

## 2019-10-11 ASSESSMENT — MIFFLIN-ST. JEOR: SCORE: 1858.96

## 2019-10-11 ASSESSMENT — PAIN SCALES - GENERAL: PAINLEVEL: SEVERE PAIN (7)

## 2019-10-11 NOTE — PROGRESS NOTES
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  Status post percutaneous cryoablation on 2019. No evidence of recurrence.    Plan:  He is nearly 2 months status post percutaneous cryoablation.    [facility] He should follow up in 6 months with CT abdomen with & without contrast, chest xray, and staging labs for surveillance of this patient's treated renal cell carcinoma    Surveillance Protocol For Renal Cell Carcinoma Following Cryoablation:    Abdominal CT 3 months after cryoablation    Abdominal CT 6 months after cryoablation      Abdominal CT and Chest Xray annually thereafter for 5 years after cryoablation  A Guideline: Renal Mass Follow-up for Clinically Localized Renal Neoplasms    ______________________________________________________________________    HPI  Samir Orlando is a 68 year old male who presents for follow up after cryoablation for right renal mass on 2019.     ##Kidney Cancer Follow up##  Patient is status post percutaneous cryoablation of right renal mass on 2019.   Tumor was on the right side.   Maximal tumor dimension was 3.3 cm.    The histologic subtype was Papillary type 2.    Pathologic Stage T1a.    Tumor thrombus level  not applicable.   Lymph Nodes Removed No.   Was the ipsilateral adrenal gland removed? No.  Neoadjuvant Chemotherapy? No.    There were not deviations from a normal post-operative course or complications?.    The patient was not readmitted to the hospital since post-operative discharge.    Today Mr. Orlando is doing well. He notes that he had no pain after surgery.     He is scheduled for knee surgery on 2019.     Review of Systems:   Pertinent  "items are noted in HPI or as below, remainder of complete ROS is negative.      Physical Exam:   /70   Pulse 53   Ht 1.88 m (6' 2\")   Wt 102.9 kg (226 lb 14.4 oz)   BMI 29.13 kg/m    Constitutional: Alert, no acute distress  Psychiatric: Normal mood and affect  Gastrointestinal: Abdomen soft, non-tender.  : Deferred    Laboratory:   I personally reviewed all applicable laboratory data and went over findings with patient  Significant for:    CBC RESULTS:  Recent Labs   Lab Test 08/28/19  0357 08/28/19  0004 08/27/19  0706 08/13/19  1253   WBC 10.0 9.9 8.4 8.7   HGB 12.4* 12.6* 14.1 14.7   * 115* 142* 139*     BMP RESULTS:  Recent Labs   Lab Test 10/11/19  1202 08/28/19  0357 08/27/19  0706 08/13/19  1253 05/12/17  0938 08/13/15  0629  08/10/15  2222   NA  --  136 135 136  --   --   --  140   POTASSIUM  --  3.5 3.3* 3.7  --  3.8   < > 3.6   CHLORIDE  --  100 97 98  --   --   --  104   CO2  --  26 27 32  --   --   --  24   ANIONGAP  --  10 11 5  --   --   --  11   GLC  --  115* 128* 122* 127*  --   --  205*   BUN  --  26 29 30  --   --   --  22   CR  --  1.41* 1.33* 1.33*  --  1.11   < > 1.33*   GFRESTIMATED 40* 50* 53* 54*  --  66   < > 54*   GFRESTBLACK 48* 58* 62 62  --  80   < > 65   CLARI  --  9.4 10.4* 10.8*  --   --   --  10.3*    < > = values in this interval not displayed.     UA RESULTS:   Recent Labs   Lab Test 08/23/19  1130   SG 1.009   URINEPH 6.5   NITRITE Negative   RBCU <1   WBCU <1       Imaging:   I personally reviewed all applicable imaging and went over the below findings with patient.    CT abdomen with & without contrast pelvis with contrast 10/11/19:   Radiology report pending.     Results for orders placed or performed during the hospital encounter of 08/27/19   CT Renal Tumor Cryoablation    Narrative    Procedures: CT-guided cryoablation of right renal mass, 8/27/2019.    Clinical indication: Solitary right kidney. Ipsilateral renal cell  carcinoma, measuring approximately 3 " cm.    Comparison studies: 4/3/2019    PROCEDURE:        Interventional radiologists: Jayne Smith MD (IR staff)    Fellow: Raf Maciel MD    Staff Urologist: Moises Garcia MD ()    Consent: verbal and written informed consent obtained prior to  procedure.    Procedure details: Patient placed in the prone position. General  endotracheal anesthesia was smoothly administered. The right flank was  prepped and draped in standard sterile fashion. Preprocedural CT scan  demonstrated adequate percutaneous window for kidney tumor  cryoablation. Using CT guidance, 4 ice ronel CX cryoablation probes were  advanced into the mass. The first 3 probes were oriented in the  sagittal plane, from superior to inferior. The final probe was  oriented in the coronal plane from a posteroanterior approach. Limited  diagnostic CT scan was obtained to confirm satisfactory positioning of  the needles. Subsequently, the lesion was treated with two 10 minute  freeze and 5 minute thaw cycles.  Postprocedural limited diagnostic CT  scan confirmed adequate covering of the cryoablation zone. The  procedure was then terminated. The probes were removed. Sterile  dressings applied. No immediate complication.     Medications: Please see anesthesiology documentation for medications  administered.     Monitoring: The patient was placed on continuous monitoring. Vital  signs were monitored by anesthesiology staff. The patient remained  stable throughout the procedure.    Sedation time: Not applicable    Complications: None.      Impression    IMPRESSION:     CT-guided cryoablation of right renal mass. No immediate complication.    PLAN:    The patient will be admitted for observation to the urology service.    IJAYNE MD, attest that I was present in the procedure room  for the entire procedure.    I have personally reviewed the examination and initial interpretation  and I agree with the findings.    JAYNE SMITH MD        Scribe Disclosure:  I, Yandy Prado, am serving as a scribe to document services personally performed by Tony Garcia MD at this visit, based upon the provider's statements to me. All documentation has been reviewed by the aforementioned provider prior to being entered into the official medical record.     Yandy Prado served as the scribe for this patient's visit and documented my history and physical exam.  I performed the history and physical exam.  I have edited and agree with the note.  JA Garcia MD

## 2019-10-11 NOTE — LETTER
10/11/2019     RE: Samir Orlando  [address] [address]     Dear Colleague,    Thank you for referring your patient, Samir Orlando, to the [facility] UROLOGY AND [facility] FOR PROSTATE AND UROLOGIC CANCERS at [facility]. Please see a copy of my visit note below.      Urology Clinic    Tony Garcia MD  Date of Service: 10/11/2019     Name: Samir Orlando  MRN: [id number]  Age: 68 year old  : 1950  Referring provider: [facility]*     Assessment and Plan:  Assessment:   Stage T1a Renal Cell Carcinoma (papillary type 2 subtype) based on biopsy.  Status post percutaneous cryoablation on 2019. No evidence of recurrence.    Plan:  He is nearly 2 months status post percutaneous cryoablation.    [facility] He should follow up in 6 months with CT abdomen with & without contrast, chest xray, and staging labs for surveillance of this patient's treated renal cell carcinoma    Surveillance Protocol For Renal Cell Carcinoma Following Cryoablation:    Abdominal CT 3 months after cryoablation    Abdominal CT 6 months after cryoablation      Abdominal CT and Chest Xray annually thereafter for 5 years after cryoablation  AUA Guideline: Renal Mass Follow-up for Clinically Localized Renal Neoplasms    ______________________________________________________________________    HPI  Samir Orlando is a 68 year old male who presents for follow up after cryoablation for right renal mass on 2019.     ##Kidney Cancer Follow up##  Patient is status post percutaneous cryoablation of right renal mass on 2019.   Tumor was on the right side.   Maximal tumor dimension was 3.3 cm.    The histologic subtype was Papillary type 2.    Pathologic Stage T1a.    Tumor thrombus level  not applicable.   Lymph Nodes Removed No.   Was the ipsilateral adrenal gland removed? No.  Neoadjuvant Chemotherapy? No.    There were not  "deviations from a normal post-operative course or complications?.    The patient was not readmitted to the hospital since post-operative discharge.    Today Mr. Orlando is doing well. He notes that he had no pain after surgery.     He is scheduled for knee surgery on 11/12/2019.     Review of Systems:   Pertinent items are noted in HPI or as below, remainder of complete ROS is negative.      Physical Exam:   /70   Pulse 53   Ht 1.88 m (6' 2\")   Wt 102.9 kg (226 lb 14.4 oz)   BMI 29.13 kg/m     Constitutional: Alert, no acute distress  Psychiatric: Normal mood and affect  Gastrointestinal: Abdomen soft, non-tender.  : Deferred    Laboratory:   I personally reviewed all applicable laboratory data and went over findings with patient  Significant for:    CBC RESULTS:  Recent Labs   Lab Test 08/28/19  0357 08/28/19  0004 08/27/19  0706 08/13/19  1253   WBC 10.0 9.9 8.4 8.7   HGB 12.4* 12.6* 14.1 14.7   * 115* 142* 139*     BMP RESULTS:  Recent Labs   Lab Test 10/11/19  1202 08/28/19  0357 08/27/19  0706 08/13/19  1253 05/12/17  0938 08/13/15  0629  08/10/15  2222   NA  --  136 135 136  --   --   --  140   POTASSIUM  --  3.5 3.3* 3.7  --  3.8   < > 3.6   CHLORIDE  --  100 97 98  --   --   --  104   CO2  --  26 27 32  --   --   --  24   ANIONGAP  --  10 11 5  --   --   --  11   GLC  --  115* 128* 122* 127*  --   --  205*   BUN  --  26 29 30  --   --   --  22   CR  --  1.41* 1.33* 1.33*  --  1.11   < > 1.33*   GFRESTIMATED 40* 50* 53* 54*  --  66   < > 54*   GFRESTBLACK 48* 58* 62 62  --  80   < > 65   CLARI  --  9.4 10.4* 10.8*  --   --   --  10.3*    < > = values in this interval not displayed.     UA RESULTS:   Recent Labs   Lab Test 08/23/19  1130   SG 1.009   URINEPH 6.5   NITRITE Negative   RBCU <1   WBCU <1       Imaging:   I personally reviewed all applicable imaging and went over the below findings with patient.    CT abdomen with & without contrast pelvis with contrast 10/11/19:   Radiology report " pending.     Results for orders placed or performed during the hospital encounter of 08/27/19   CT Renal Tumor Cryoablation    Narrative    Procedures: CT-guided cryoablation of right renal mass, 8/27/2019.    Clinical indication: Solitary right kidney. Ipsilateral renal cell  carcinoma, measuring approximately 3 cm.    Comparison studies: 4/3/2019    PROCEDURE:        Interventional radiologists: Bob Smith MD (IR staff)    Fellow: Raf Maciel MD    Staff Urologist: Moises Garcia MD ()    Consent: verbal and written informed consent obtained prior to  procedure.    Procedure details: Patient placed in the prone position. General endotracheal anesthesia was smoothly administered. The right flank was prepped and draped in standard sterile fashion. Preprocedural CT scan demonstrated adequate percutaneous window for kidney tumor  cryoablation. Using CT guidance, 4 ice ronel CX cryoablation probes were advanced into the mass. The first 3 probes were oriented in the sagittal plane, from superior to inferior. The final probe was  oriented in the coronal plane from a posteroanterior approach. Limited diagnostic CT scan was obtained to confirm satisfactory positioning of the needles. Subsequently, the lesion was treated with two 10 minute freeze and 5 minute thaw cycles.  Postprocedural limited diagnostic CT scan confirmed adequate covering of the cryoablation zone. The procedure was then terminated. The probes were removed. Sterile dressings applied. No immediate complication.     Medications: Please see anesthesiology documentation for medications  administered.     Monitoring: The patient was placed on continuous monitoring. Vital signs were monitored by anesthesiology staff. The patient remained stable throughout the procedure.    Sedation time: Not applicable    Complications: None.      Impression    IMPRESSION:     CT-guided cryoablation of right renal mass. No immediate  complication.    PLAN:    The patient will be admitted for observation to the urology service.    I, JAYNE HOOPER MD, attest that I was present in the procedure room  for the entire procedure.    I have personally reviewed the examination and initial interpretation  and I agree with the findings.    JAYNE HOOPER MD         Scribe Disclosure:  I, Yandy Johan, am serving as a scribe to document services personally performed by Tony Garcia MD at this visit, based upon the provider's statements to me. All documentation has been reviewed by the aforementioned provider prior to being entered into the official medical record.     Yandy Prado served as the scribe for this patient's visit and documented my history and physical exam.  I performed the history and physical exam.  I have edited and agree with the note.  JA Garcia MD    Again, thank you for allowing me to participate in the care of your patient.      Sincerely,    Tony Garcia MD

## 2019-10-11 NOTE — NURSING NOTE
Chief Complaint   Patient presents with     RECHECK     Post op.  S/P Percutaneous cryoablation of right renal mass on 8-27-19.       Abigail Mcgovern MA

## 2019-10-11 NOTE — PROGRESS NOTES
Urology Clinic    Tony Garcia MD  Date of Service: 10/11/2019     Name: Samir Orlando  MRN: [id number]  Age: 68 year old  : 1950  Referring provider: [facility]     Assessment and Plan:  Assessment:   Stage *** Renal Cell Carcinoma (papillary type 2 subtype) based on biopsy.  Status post percutaneous cryoablation on 2019. No evidence of recurrence.    Plan:  He is *** months status post percutaneous cryoablation.    I will schedule a follow-up appointment in *** months with *** chest xray and staging labs for surveillance of this patient's treated renal cell carcinoma    Surveillance Protocol For Renal Cell Carcinoma Following Cryoablation:    Abdominal CT 3 months after cryoablation    Abdominal CT 6 months after cryoablation      Abdominal CT and Chest Xray annually thereafter for 5 years after cryoablation  AUA Guideline: Renal Mass Follow-up for Clinically Localized Renal Neoplasms    ______________________________________________________________________    HPI  Samir Orlando is a 68 year old male who presents for follow up after cryoablation for right renal mass on 2019.     ##Kidney Cancer Follow up##  Patient is status post percutaneous cryoablation of right renal mass on 2019.   Tumor was on the right side.   Maximal tumor dimension was 3.3 cm.    The histologic subtype was Papillary type 2.    Pathologic Stage {kidney stage:639708}.    Tumor thrombus level  not applicable.   Lymph Nodes Removed No.   Was the ipsilateral adrenal gland removed? No.  Neoadjuvant Chemotherapy? No.    There {WERE:497218} deviations from a normal post-operative course or complications?.    If a deviation/complication occurred, the most severe deviation was ***, which is Clavian {Clavian Dindo Complication Grade:754015}.    The patient {WAS / WAS NOT:562305} readmitted to the hospital since post-operative discharge.    Review of Systems:   Pertinent items are noted in HPI  or as below, remainder of complete ROS is negative.      Physical Exam:   There were no vitals taken for this visit.  Constitutional: Alert, no acute distress  Psychiatric: Normal mood and affect  Gastrointestinal: Abdomen soft, non-tender.  : ***Deferred    Laboratory:   I personally reviewed all applicable laboratory data and went over findings with patient  Significant for:    CBC RESULTS:  Recent Labs   Lab Test 08/28/19  0357 08/28/19  0004 08/27/19  0706 08/13/19  1253   WBC 10.0 9.9 8.4 8.7   HGB 12.4* 12.6* 14.1 14.7   * 115* 142* 139*     BMP RESULTS:  Recent Labs   Lab Test 10/11/19  1202 08/28/19  0357 08/27/19  0706 08/13/19  1253 05/12/17  0938 08/13/15  0629  08/10/15  2222   NA  --  136 135 136  --   --   --  140   POTASSIUM  --  3.5 3.3* 3.7  --  3.8   < > 3.6   CHLORIDE  --  100 97 98  --   --   --  104   CO2  --  26 27 32  --   --   --  24   ANIONGAP  --  10 11 5  --   --   --  11   GLC  --  115* 128* 122* 127*  --   --  205*   BUN  --  26 29 30  --   --   --  22   CR  --  1.41* 1.33* 1.33*  --  1.11   < > 1.33*   GFRESTIMATED 40* 50* 53* 54*  --  66   < > 54*   GFRESTBLACK 48* 58* 62 62  --  80   < > 65   CLARI  --  9.4 10.4* 10.8*  --   --   --  10.3*    < > = values in this interval not displayed.     UA RESULTS:   Recent Labs   Lab Test 08/23/19  1130   SG 1.009   URINEPH 6.5   NITRITE Negative   RBCU <1   WBCU <1       Imaging:   I personally reviewed all applicable imaging and went over the below findings with patient.    Results for orders placed or performed during the hospital encounter of 08/27/19   CT Renal Tumor Cryoablation    Narrative    Procedures: CT-guided cryoablation of right renal mass, 8/27/2019.    Clinical indication: Solitary right kidney. Ipsilateral renal cell  carcinoma, measuring approximately 3 cm.    Comparison studies: 4/3/2019    PROCEDURE:        Interventional radiologists: Bob Smith MD (IR staff)    Fellow: Raf Maciel MD    Staff Urologist: Moises  MD Jose ()    Consent: verbal and written informed consent obtained prior to  procedure.    Procedure details: Patient placed in the prone position. General  endotracheal anesthesia was smoothly administered. The right flank was  prepped and draped in standard sterile fashion. Preprocedural CT scan  demonstrated adequate percutaneous window for kidney tumor  cryoablation. Using CT guidance, 4 ice ronel CX cryoablation probes were  advanced into the mass. The first 3 probes were oriented in the  sagittal plane, from superior to inferior. The final probe was  oriented in the coronal plane from a posteroanterior approach. Limited  diagnostic CT scan was obtained to confirm satisfactory positioning of  the needles. Subsequently, the lesion was treated with two 10 minute  freeze and 5 minute thaw cycles.  Postprocedural limited diagnostic CT  scan confirmed adequate covering of the cryoablation zone. The  procedure was then terminated. The probes were removed. Sterile  dressings applied. No immediate complication.     Medications: Please see anesthesiology documentation for medications  administered.     Monitoring: The patient was placed on continuous monitoring. Vital  signs were monitored by anesthesiology staff. The patient remained  stable throughout the procedure.    Sedation time: Not applicable    Complications: None.      Impression    IMPRESSION:     CT-guided cryoablation of right renal mass. No immediate complication.    PLAN:    The patient will be admitted for observation to the urology service.    JAYNE GRACE MD, attest that I was present in the procedure room  for the entire procedure.    I have personally reviewed the examination and initial interpretation  and I agree with the findings.    JAYNE HOOPER MD       Scribe Disclosure:  Yandy GRACE, am serving as a scribe to document services personally performed by Tony Garcia MD at this visit, based upon the  provider's statements to me. All documentation has been reviewed by the aforementioned provider prior to being entered into the official medical record.

## 2019-11-04 NOTE — RESULT ENCOUNTER NOTE
Young Orlando,  Good news!  There is no evidence of recurrent cancer on you scans.  Thank you, Moises Garcia.

## 2020-02-08 ENCOUNTER — HEALTH MAINTENANCE LETTER (OUTPATIENT)
Age: 70
End: 2020-02-08

## 2020-11-08 ENCOUNTER — HEALTH MAINTENANCE LETTER (OUTPATIENT)
Age: 70
End: 2020-11-08

## 2021-03-28 ENCOUNTER — HEALTH MAINTENANCE LETTER (OUTPATIENT)
Age: 71
End: 2021-03-28

## 2021-09-11 ENCOUNTER — HEALTH MAINTENANCE LETTER (OUTPATIENT)
Age: 71
End: 2021-09-11

## 2022-04-23 ENCOUNTER — HEALTH MAINTENANCE LETTER (OUTPATIENT)
Age: 72
End: 2022-04-23

## 2022-10-30 ENCOUNTER — HEALTH MAINTENANCE LETTER (OUTPATIENT)
Age: 72
End: 2022-10-30

## 2023-06-01 ENCOUNTER — HEALTH MAINTENANCE LETTER (OUTPATIENT)
Age: 73
End: 2023-06-01

## (undated) RX ORDER — EPHEDRINE SULFATE 50 MG/ML
INJECTION, SOLUTION INTRAMUSCULAR; INTRAVENOUS; SUBCUTANEOUS
Status: DISPENSED
Start: 2019-08-27

## (undated) RX ORDER — PROPOFOL 10 MG/ML
INJECTION, EMULSION INTRAVENOUS
Status: DISPENSED
Start: 2019-08-27

## (undated) RX ORDER — FENTANYL CITRATE 50 UG/ML
INJECTION, SOLUTION INTRAMUSCULAR; INTRAVENOUS
Status: DISPENSED
Start: 2019-08-27

## (undated) RX ORDER — PHENYLEPHRINE HCL IN 0.9% NACL 1 MG/10 ML
SYRINGE (ML) INTRAVENOUS
Status: DISPENSED
Start: 2019-08-27

## (undated) RX ORDER — ONDANSETRON 2 MG/ML
INJECTION INTRAMUSCULAR; INTRAVENOUS
Status: DISPENSED
Start: 2019-08-27

## (undated) RX ORDER — METOPROLOL TARTRATE 25 MG/1
TABLET, FILM COATED ORAL
Status: DISPENSED
Start: 2019-08-27

## (undated) RX ORDER — CEFAZOLIN SODIUM 1 G/50ML
SOLUTION INTRAVENOUS
Status: DISPENSED
Start: 2019-08-27

## (undated) RX ORDER — SODIUM CHLORIDE 9 MG/ML
INJECTION, SOLUTION INTRAVENOUS
Status: DISPENSED
Start: 2019-08-27

## (undated) RX ORDER — LIDOCAINE HYDROCHLORIDE 20 MG/ML
INJECTION, SOLUTION EPIDURAL; INFILTRATION; INTRACAUDAL; PERINEURAL
Status: DISPENSED
Start: 2019-08-27

## (undated) RX ORDER — GLYCOPYRROLATE 0.2 MG/ML
INJECTION, SOLUTION INTRAMUSCULAR; INTRAVENOUS
Status: DISPENSED
Start: 2019-08-27

## (undated) RX ORDER — LIDOCAINE HYDROCHLORIDE 10 MG/ML
INJECTION, SOLUTION EPIDURAL; INFILTRATION; INTRACAUDAL; PERINEURAL
Status: DISPENSED
Start: 2019-08-27